# Patient Record
Sex: FEMALE | Race: WHITE | HISPANIC OR LATINO | URBAN - METROPOLITAN AREA
[De-identification: names, ages, dates, MRNs, and addresses within clinical notes are randomized per-mention and may not be internally consistent; named-entity substitution may affect disease eponyms.]

---

## 2017-03-28 PROBLEM — Z00.00 ENCOUNTER FOR PREVENTIVE HEALTH EXAMINATION: Status: ACTIVE | Noted: 2017-03-28

## 2017-03-31 ENCOUNTER — OUTPATIENT (OUTPATIENT)
Dept: OUTPATIENT SERVICES | Facility: HOSPITAL | Age: 62
LOS: 1 days | Discharge: HOME | End: 2017-03-31

## 2017-04-11 ENCOUNTER — APPOINTMENT (OUTPATIENT)
Dept: VASCULAR SURGERY | Facility: CLINIC | Age: 62
End: 2017-04-11

## 2017-04-11 VITALS
HEIGHT: 62 IN | WEIGHT: 134 LBS | BODY MASS INDEX: 24.66 KG/M2 | SYSTOLIC BLOOD PRESSURE: 134 MMHG | DIASTOLIC BLOOD PRESSURE: 80 MMHG

## 2017-04-11 DIAGNOSIS — I65.23 OCCLUSION AND STENOSIS OF BILATERAL CAROTID ARTERIES: ICD-10-CM

## 2017-04-11 DIAGNOSIS — Z86.79 PERSONAL HISTORY OF OTHER DISEASES OF THE CIRCULATORY SYSTEM: ICD-10-CM

## 2017-04-11 DIAGNOSIS — Z86.39 PERSONAL HISTORY OF OTHER ENDOCRINE, NUTRITIONAL AND METABOLIC DISEASE: ICD-10-CM

## 2017-04-11 DIAGNOSIS — Z87.39 PERSONAL HISTORY OF OTHER DISEASES OF THE MUSCULOSKELETAL SYSTEM AND CONNECTIVE TISSUE: ICD-10-CM

## 2017-04-11 RX ORDER — UBIDECARENONE 200 MG
CAPSULE ORAL
Refills: 0 | Status: ACTIVE | COMMUNITY

## 2017-04-11 RX ORDER — LOSARTAN POTASSIUM 100 MG/1
100 TABLET, FILM COATED ORAL
Refills: 0 | Status: ACTIVE | COMMUNITY

## 2017-04-11 RX ORDER — ROSUVASTATIN CALCIUM 10 MG/1
10 TABLET, FILM COATED ORAL
Refills: 0 | Status: ACTIVE | COMMUNITY

## 2017-04-11 RX ORDER — HYDROCHLOROTHIAZIDE 25 MG/1
25 TABLET ORAL
Refills: 0 | Status: ACTIVE | COMMUNITY

## 2017-06-27 DIAGNOSIS — R91.8 OTHER NONSPECIFIC ABNORMAL FINDING OF LUNG FIELD: ICD-10-CM

## 2017-10-18 ENCOUNTER — OUTPATIENT (OUTPATIENT)
Dept: OUTPATIENT SERVICES | Facility: HOSPITAL | Age: 62
LOS: 1 days | Discharge: HOME | End: 2017-10-18

## 2017-10-18 DIAGNOSIS — Z12.31 ENCOUNTER FOR SCREENING MAMMOGRAM FOR MALIGNANT NEOPLASM OF BREAST: ICD-10-CM

## 2017-12-28 ENCOUNTER — TRANSCRIPTION ENCOUNTER (OUTPATIENT)
Age: 62
End: 2017-12-28

## 2018-10-29 ENCOUNTER — EMERGENCY (EMERGENCY)
Facility: HOSPITAL | Age: 63
LOS: 0 days | Discharge: HOME | End: 2018-10-29
Attending: EMERGENCY MEDICINE | Admitting: EMERGENCY MEDICINE

## 2018-10-29 VITALS
SYSTOLIC BLOOD PRESSURE: 215 MMHG | DIASTOLIC BLOOD PRESSURE: 118 MMHG | RESPIRATION RATE: 20 BRPM | OXYGEN SATURATION: 99 % | TEMPERATURE: 97 F | HEART RATE: 93 BPM

## 2018-10-29 VITALS — WEIGHT: 134.92 LBS

## 2018-10-29 DIAGNOSIS — S42.301A UNSPECIFIED FRACTURE OF SHAFT OF HUMERUS, RIGHT ARM, INITIAL ENCOUNTER FOR CLOSED FRACTURE: ICD-10-CM
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
## 2018-10-29 DIAGNOSIS — M25.511 PAIN IN RIGHT SHOULDER: ICD-10-CM
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
## 2018-10-29 DIAGNOSIS — I10 ESSENTIAL (PRIMARY) HYPERTENSION: ICD-10-CM
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
## 2018-10-29 RX ORDER — MORPHINE SULFATE 50 MG/1
4 CAPSULE, EXTENDED RELEASE ORAL ONCE
Qty: 0 | Refills: 0 | Status: DISCONTINUED | OUTPATIENT
Start: 2018-10-29 | End: 2018-10-29

## 2018-10-29 RX ORDER — SODIUM CHLORIDE 9 MG/ML
1000 INJECTION, SOLUTION INTRAVENOUS ONCE
Qty: 0 | Refills: 0 | Status: COMPLETED | OUTPATIENT
Start: 2018-10-29 | End: 2018-10-29

## 2018-10-29 RX ADMIN — SODIUM CHLORIDE 1000 MILLILITER(S): 9 INJECTION, SOLUTION INTRAVENOUS at 13:52

## 2018-10-29 RX ADMIN — MORPHINE SULFATE 4 MILLIGRAM(S): 50 CAPSULE, EXTENDED RELEASE ORAL at 13:10

## 2018-10-29 RX ADMIN — MORPHINE SULFATE 4 MILLIGRAM(S): 50 CAPSULE, EXTENDED RELEASE ORAL at 13:49

## 2018-10-29 NOTE — ED PROVIDER NOTE - PHYSICAL EXAMINATION
CONSTITUTIONAL: well developed, well nourished  HEAD: normocephalic, atraumatic  EYES: PERRL no raccoon eyes  NECK: no midline tenderness, no stepoffs, no deformity  CV: regular rate and rhythm, equal distal pulses  RESP: lungs clear to auscultation bilaterally, normal work of breathing, symmetric rise and fall of chest   CHEST: no chest wall tenderness, no crepitus, no clavicular deformity/tenting  ABD: soft, nondistended, nontender, no rebound, no guarding, no rigidity  BACK: no midline T/L/S tenderness, no stepoffs, no deformity  EXT: swelling and deformity of R shoulder with tenderness along lateral aspect, R shoulder in flexion and internal rotation, 2+ radial and ulnar pulses, sensation intact to touch, normal ROM in elbow and wrist, good  strength, cap refill < 2 seconds  SKIN: no rashes, no lacerations, no abrasions  NEURO: A&Ox3, sensation grossly intact throughout  PSYCH: normal mood, appropriate affect

## 2018-10-29 NOTE — ED PROVIDER NOTE - CARE PROVIDER_API CALL
Kayode Dash (MD), Orthopaedic Surgery  [facility] [place] [address]  Phone: [phone]  Fax: [phone]

## 2018-10-29 NOTE — SBIRT NOTE. - NSSBIRTSERVICES_GEN_A_ED_FT
Provided SBIRT services: Full Screen Negative    Positive reinforcement provided given patient currently within healthy guidelines. Education materials reviewed and given to patient.    AUDIT Score: 5  DAST-10 Score: 0  Duration = 5 Minutes

## 2018-10-29 NOTE — ED PROVIDER NOTE - PROGRESS NOTE DETAILS
62 yo F with isolated R shoulder trauma. Neurovascularly intact. ROM limited secondary to pain. Comminuted fx of humeral neck seen on xray. Given morphine for pain. Placed in shoulder sling and given ortho follow up. ED return precautions given. Family verbalized understanding and was agreeable with plan. 64 Y/O F HTN S/P TRIP AND FALL OVER A BABY GATE. NO HEAD TRAUMA OR LOC. PT C/O R SHOULDER PAIN. PT DENIES ANY OTHER INJURIES. NO RUE PARESTHESIAS OR MOTOR WEAKNESS. PT R HANDED. VITALS NOTED. ALERT OX3 NAD GCS-15 NCAT. NO MIDLINE C SPINE TENDERNESS. R SHOULDER WITH DEFORMITY AND EDEMA AND TENDERNESS. NO AC OR CLAVICLE TENDERNESS. R ELBOW NONTENDER, FROM. 2+ RADIAL PULSES B/L. RUE NVI. NO SENSORY DEFICIT.

## 2018-10-29 NOTE — ED PROVIDER NOTE - NSFOLLOWUPINSTRUCTIONS_ED_ALL_ED_FT
Keep your arm in the sling  Take ibuprofen or tylenol every 6-8 hours as needed  Follow up with your orthopedist within 1 week  Return to the emergency department if you develop numbness, tingling, or weakness

## 2018-10-29 NOTE — ED PROVIDER NOTE - MEDICAL DECISION MAKING DETAILS
64 Y/O F WITH R PROXIMAL HUMERUS FRACTURE S/P MECHANICAL FALL. PT PLACED IN SLING AND GIVEN ORTHOPEDIC FOLLOW UP. PT DISCHARGED TO HOME WITH HER FAMILY AND PT WILL STAY WITH DAUGHTER FOR ASSISTANCE.

## 2018-10-29 NOTE — ED PROVIDER NOTE - OBJECTIVE STATEMENT
64 yo F with hx of HTN and osteopenia who presents with R shoulder pain after mechanical fall that occurred 1 hr prior to arrival. Pt was holding her grandchild in her L arm and tripped over a small gate, hitting her R shoulder against the wall and then against the ground. No head trauma, LOC, or any other trauma. Able to ambulate after but with severe pain in R shoulder, worse with movement. Took ibuprofen 600 mg prior to arrival.

## 2018-10-29 NOTE — ED ADULT NURSE REASSESSMENT NOTE - NS ED NURSE REASSESS COMMENT FT1
Pt A&O X 3, NAD, VSS pain 6/10 right shoulder. Pt with family at bedside, steady gait. Pt awaiting further MD evaluation, will continue to monitor.

## 2018-10-29 NOTE — ED ADULT TRIAGE NOTE - CHIEF COMPLAINT QUOTE
"I was carrying my grandkid on my right arm, I stepped over a doggy fence and I tripped and hit the wall and then fell on the floor." (+) right shoulder pain. Denies LOC. Not taking any anticoagulant.

## 2018-10-29 NOTE — ED PROVIDER NOTE - NS ED ROS FT
GEN:  no fever, no chills  NEURO:  no headache, no LOC  ENT: no sore throat, no runny nose  CV:  no chest pain, no SOB  RESP:  no dyspnea, no cough  GI:  no nausea, no vomiting, no abdominal pain  BACK; no back pain, no neck pain  MSK:  + shoulder pain  SKIN:  no rash, no bruising  HEME: no hematochezia, no melena

## 2018-10-29 NOTE — ED ADULT NURSE NOTE - NSIMPLEMENTINTERV_GEN_ALL_ED
Implemented All Universal Safety Interventions:  [place] to call system. Call bell, personal items and telephone within reach. Instruct patient to call for assistance. Room bathroom lighting operational. Non-slip footwear when patient is off stretcher. Physically safe environment: no spills, clutter or unnecessary equipment. Stretcher in lowest position, wheels locked, appropriate side rails in place.

## 2018-11-08 PROBLEM — Z87.39 PERSONAL HISTORY OF OTHER DISEASES OF THE MUSCULOSKELETAL SYSTEM AND CONNECTIVE TISSUE: Chronic | Status: ACTIVE | Noted: 2018-10-29

## 2018-11-08 PROBLEM — I10 ESSENTIAL (PRIMARY) HYPERTENSION: Chronic | Status: ACTIVE | Noted: 2018-10-29

## 2018-11-08 PROBLEM — E78.00 PURE HYPERCHOLESTEROLEMIA, UNSPECIFIED: Chronic | Status: ACTIVE | Noted: 2018-10-29

## 2018-11-12 ENCOUNTER — OUTPATIENT (OUTPATIENT)
Dept: OUTPATIENT SERVICES | Facility: HOSPITAL | Age: 63
LOS: 1 days | Discharge: HOME | End: 2018-11-12

## 2018-11-12 DIAGNOSIS — M25.511 PAIN IN RIGHT SHOULDER: ICD-10-CM

## 2018-11-12 DIAGNOSIS — M25.521 PAIN IN RIGHT ELBOW: ICD-10-CM

## 2020-01-03 ENCOUNTER — TRANSCRIPTION ENCOUNTER (OUTPATIENT)
Age: 65
End: 2020-01-03

## 2020-02-05 ENCOUNTER — EMERGENCY (EMERGENCY)
Facility: HOSPITAL | Age: 65
LOS: 0 days | Discharge: HOME | End: 2020-02-05
Attending: EMERGENCY MEDICINE | Admitting: EMERGENCY MEDICINE
Payer: MEDICARE

## 2020-02-05 VITALS — DIASTOLIC BLOOD PRESSURE: 67 MMHG | HEART RATE: 60 BPM | SYSTOLIC BLOOD PRESSURE: 141 MMHG

## 2020-02-05 VITALS
RESPIRATION RATE: 18 BRPM | SYSTOLIC BLOOD PRESSURE: 226 MMHG | HEIGHT: 62 IN | HEART RATE: 73 BPM | OXYGEN SATURATION: 98 % | WEIGHT: 132.06 LBS | DIASTOLIC BLOOD PRESSURE: 102 MMHG | TEMPERATURE: 91 F

## 2020-02-05 DIAGNOSIS — I10 ESSENTIAL (PRIMARY) HYPERTENSION: ICD-10-CM

## 2020-02-05 DIAGNOSIS — R51 HEADACHE: ICD-10-CM

## 2020-02-05 DIAGNOSIS — Z79.899 OTHER LONG TERM (CURRENT) DRUG THERAPY: ICD-10-CM

## 2020-02-05 LAB
ANION GAP SERPL CALC-SCNC: 13 MMOL/L — SIGNIFICANT CHANGE UP (ref 7–14)
BASOPHILS # BLD AUTO: 0.02 K/UL — SIGNIFICANT CHANGE UP (ref 0–0.2)
BASOPHILS NFR BLD AUTO: 0.4 % — SIGNIFICANT CHANGE UP (ref 0–1)
BUN SERPL-MCNC: 16 MG/DL — SIGNIFICANT CHANGE UP (ref 10–20)
CALCIUM SERPL-MCNC: 9.5 MG/DL — SIGNIFICANT CHANGE UP (ref 8.5–10.1)
CHLORIDE SERPL-SCNC: 98 MMOL/L — SIGNIFICANT CHANGE UP (ref 98–110)
CO2 SERPL-SCNC: 27 MMOL/L — SIGNIFICANT CHANGE UP (ref 17–32)
CREAT SERPL-MCNC: 0.7 MG/DL — SIGNIFICANT CHANGE UP (ref 0.7–1.5)
EOSINOPHIL # BLD AUTO: 0.15 K/UL — SIGNIFICANT CHANGE UP (ref 0–0.7)
EOSINOPHIL NFR BLD AUTO: 3.1 % — SIGNIFICANT CHANGE UP (ref 0–8)
GLUCOSE SERPL-MCNC: 97 MG/DL — SIGNIFICANT CHANGE UP (ref 70–99)
HCT VFR BLD CALC: 39.1 % — SIGNIFICANT CHANGE UP (ref 37–47)
HGB BLD-MCNC: 13.5 G/DL — SIGNIFICANT CHANGE UP (ref 12–16)
IMM GRANULOCYTES NFR BLD AUTO: 0.4 % — HIGH (ref 0.1–0.3)
LYMPHOCYTES # BLD AUTO: 1.44 K/UL — SIGNIFICANT CHANGE UP (ref 1.2–3.4)
LYMPHOCYTES # BLD AUTO: 29.7 % — SIGNIFICANT CHANGE UP (ref 20.5–51.1)
MCHC RBC-ENTMCNC: 29.7 PG — SIGNIFICANT CHANGE UP (ref 27–31)
MCHC RBC-ENTMCNC: 34.5 G/DL — SIGNIFICANT CHANGE UP (ref 32–37)
MCV RBC AUTO: 86.1 FL — SIGNIFICANT CHANGE UP (ref 81–99)
MONOCYTES # BLD AUTO: 0.45 K/UL — SIGNIFICANT CHANGE UP (ref 0.1–0.6)
MONOCYTES NFR BLD AUTO: 9.3 % — SIGNIFICANT CHANGE UP (ref 1.7–9.3)
NEUTROPHILS # BLD AUTO: 2.77 K/UL — SIGNIFICANT CHANGE UP (ref 1.4–6.5)
NEUTROPHILS NFR BLD AUTO: 57.1 % — SIGNIFICANT CHANGE UP (ref 42.2–75.2)
NRBC # BLD: 0 /100 WBCS — SIGNIFICANT CHANGE UP (ref 0–0)
PLATELET # BLD AUTO: 184 K/UL — SIGNIFICANT CHANGE UP (ref 130–400)
POTASSIUM SERPL-MCNC: 3.8 MMOL/L — SIGNIFICANT CHANGE UP (ref 3.5–5)
POTASSIUM SERPL-SCNC: 3.8 MMOL/L — SIGNIFICANT CHANGE UP (ref 3.5–5)
RBC # BLD: 4.54 M/UL — SIGNIFICANT CHANGE UP (ref 4.2–5.4)
RBC # FLD: 13.7 % — SIGNIFICANT CHANGE UP (ref 11.5–14.5)
SODIUM SERPL-SCNC: 138 MMOL/L — SIGNIFICANT CHANGE UP (ref 135–146)
WBC # BLD: 4.85 K/UL — SIGNIFICANT CHANGE UP (ref 4.8–10.8)
WBC # FLD AUTO: 4.85 K/UL — SIGNIFICANT CHANGE UP (ref 4.8–10.8)

## 2020-02-05 PROCEDURE — 99284 EMERGENCY DEPT VISIT MOD MDM: CPT

## 2020-02-05 RX ORDER — IBUPROFEN 200 MG
600 TABLET ORAL ONCE
Refills: 0 | Status: COMPLETED | OUTPATIENT
Start: 2020-02-05 | End: 2020-02-05

## 2020-02-05 RX ORDER — METOPROLOL TARTRATE 50 MG
50 TABLET ORAL ONCE
Refills: 0 | Status: COMPLETED | OUTPATIENT
Start: 2020-02-05 | End: 2020-02-05

## 2020-02-05 RX ORDER — ACETAMINOPHEN 500 MG
975 TABLET ORAL ONCE
Refills: 0 | Status: COMPLETED | OUTPATIENT
Start: 2020-02-05 | End: 2020-02-05

## 2020-02-05 RX ORDER — METOPROLOL TARTRATE 50 MG
1 TABLET ORAL
Qty: 60 | Refills: 0
Start: 2020-02-05 | End: 2020-03-05

## 2020-02-05 RX ADMIN — Medication 975 MILLIGRAM(S): at 14:08

## 2020-02-05 RX ADMIN — Medication 600 MILLIGRAM(S): at 15:25

## 2020-02-05 RX ADMIN — Medication 50 MILLIGRAM(S): at 14:09

## 2020-02-05 NOTE — ED PROVIDER NOTE - PHYSICAL EXAMINATION
VITAL SIGNS: I have reviewed nursing notes and confirm.  CONSTITUTIONAL: Well-developed; well-nourished; in no acute distress.   SKIN:  skin exam is warm and dry, no acute rash.    HEAD: Normocephalic; atraumatic.  EYES:  conjunctiva and sclera clear.  ENT: No nasal discharge; airway clear.  CARD: S1, S2 normal; no murmurs, gallops, or rubs. Regular rate and rhythm.   RESP: No wheezes, rales or rhonchi.  ABD: Normal bowel sounds; soft; non-distended; non-tender  EXT: Normal ROM.  No clubbing, cyanosis or edema.   LYMPH: No acute cervical adenopathy.  NEURO: ambulating well, steady gait, sensation intact, muscle strength 5/5 throughout Alert, oriented, grossly unremarkable

## 2020-02-05 NOTE — ED PROVIDER NOTE - PROGRESS NOTE DETAILS
pt headache improved, still present, given scrot for Meotprolol 50 BID as per rec from Dr. Monreal, will f/u this week, pt agreeable to plan

## 2020-02-05 NOTE — ED ADULT NURSE NOTE - NSIMPLEMENTINTERV_GEN_ALL_ED
Implemented All Universal Safety Interventions:  [place] to call system. Call bell, personal items and telephone within reach. Instruct patient to call for assistance. Room bathroom lighting operational. Non-slip footwear when patient is off stretcher. Physically safe environment: no spills, clutter or unnecessary equipment. Stretcher in lowest position, wheels locked, appropriate side rails in place.

## 2020-02-05 NOTE — ED PROVIDER NOTE - PATIENT PORTAL LINK FT
You can access the FollowMyHealth Patient Portal offered by [facility] by registering at the following website: [email or web address]. By joining [employer]’s FollowMyHealth portal, you will also be able to view your health information using other applications (apps) compatible with our system.

## 2020-02-05 NOTE — ED PROVIDER NOTE - OBJECTIVE STATEMENT
Pt is a 63y/o female with a pmhx of HTN, hld presents today for eval of 1 week of tension like headache, that has been constant, gradual onset, no aggravating/alleviating factors. Pt denies fever, chills, weakness, numbness, CP, SOB, Abd pain.

## 2020-02-05 NOTE — ED PROVIDER NOTE - NSFOLLOWUPINSTRUCTIONS_ED_ALL_ED_FT
General Headache Without Cause  A headache is pain or discomfort felt around the head or neck area. The specific cause of a headache may not be found. There are many causes and types of headaches. A few common ones are:    Tension headaches.  Migraine headaches.  Cluster headaches.  Chronic daily headaches.    Follow these instructions at home:  Watch your condition for any changes. Take these steps to help with your condition:    Managing pain     Take over-the-counter and prescription medicines only as told by your health care provider.  Lie down in a dark, quiet room when you have a headache.  If directed, apply ice to the head and neck area:    Put ice in a plastic bag.  Place a towel between your skin and the bag.  Leave the ice on for 20 minutes, 2–3 times per day.    Use a heating pad or hot shower to apply heat to the head and neck area as told by your health care provider.  ImageKeep lights dim if bright lights bother you or make your headaches worse.  Eating and drinking     Eat meals on a regular schedule.  Limit alcohol use.  Decrease the amount of caffeine you drink, or stop drinking caffeine.  General instructions     Keep all follow-up visits as told by your health care provider. This is important.  Keep a headache journal to help find out what may trigger your headaches. For example, write down:    What you eat and drink.  How much sleep you get.  Any change to your diet or medicines.    Try massage or other relaxation techniques.  Limit stress.  Sit up straight, and do not tense your muscles.  Do not use tobacco products, including cigarettes, chewing tobacco, or e-cigarettes. If you need help quitting, ask your health care provider.  Exercise regularly as told by your health care provider.  ImageSleep on a regular schedule. Get 7–9 hours of sleep, or the amount recommended by your health care provider.  Contact a health care provider if:  Your symptoms are not helped by medicine.  You have a headache that is different from the usual headache.  You have nausea or you vomit.  You have a fever.  Get help right away if:  Your headache becomes severe.  You have repeated vomiting.  You have a stiff neck.  You have a loss of vision.  You have problems with speech.  You have pain in the eye or ear.  You have muscular weakness or loss of muscle control.  You lose your balance or have trouble walking.  You feel faint or pass out.  You have confusion.  This information is not intended to replace advice given to you by your health care provider. Make sure you discuss any questions you have with your health care provider.    Hypertension  Hypertension, commonly called high blood pressure, is when the force of blood pumping through the arteries is too strong. The arteries are the blood vessels that carry blood from the heart throughout the body. Hypertension forces the heart to work harder to pump blood and may cause arteries to become narrow or stiff. Having untreated or uncontrolled hypertension can cause heart attacks, strokes, kidney disease, and other problems.    A blood pressure reading consists of a higher number over a lower number. Ideally, your blood pressure should be below 120/80. The first ("top") number is called the systolic pressure. It is a measure of the pressure in your arteries as your heart beats. The second ("bottom") number is called the diastolic pressure. It is a measure of the pressure in your arteries as the heart relaxes.    What are the causes?  The cause of this condition is not known.    What increases the risk?  Some risk factors for high blood pressure are under your control. Others are not.    Factors you can change     Smoking.  Having type 2 diabetes mellitus, high cholesterol, or both.  Not getting enough exercise or physical activity.  Being overweight.  Having too much fat, sugar, calories, or salt (sodium) in your diet.  Drinking too much alcohol.  Factors that are difficult or impossible to change     Having chronic kidney disease.  Having a family history of high blood pressure.  Age. Risk increases with age.  Race. You may be at higher risk if you are -American.  Gender. Men are at higher risk than women before age 45. After age 65, women are at higher risk than men.  Having obstructive sleep apnea.  Stress.  What are the signs or symptoms?  Extremely high blood pressure (hypertensive crisis) may cause:    Headache.  Anxiety.  Shortness of breath.  Nosebleed.  Nausea and vomiting.  Severe chest pain.  Jerky movements you cannot control (seizures).    How is this diagnosed?  This condition is diagnosed by measuring your blood pressure while you are seated, with your arm resting on a surface. The cuff of the blood pressure monitor will be placed directly against the skin of your upper arm at the level of your heart. It should be measured at least twice using the same arm. Certain conditions can cause a difference in blood pressure between your right and left arms.    Certain factors can cause blood pressure readings to be lower or higher than normal (elevated) for a short period of time:    When your blood pressure is higher when you are in a health care provider's office than when you are at home, this is called white coat hypertension. Most people with this condition do not need medicines.  When your blood pressure is higher at home than when you are in a health care provider's office, this is called masked hypertension. Most people with this condition may need medicines to control blood pressure.    If you have a high blood pressure reading during one visit or you have normal blood pressure with other risk factors:    You may be asked to return on a different day to have your blood pressure checked again.  You may be asked to monitor your blood pressure at home for 1 week or longer.    If you are diagnosed with hypertension, you may have other blood or imaging tests to help your health care provider understand your overall risk for other conditions.    How is this treated?  This condition is treated by making healthy lifestyle changes, such as eating healthy foods, exercising more, and reducing your alcohol intake. Your health care provider may prescribe medicine if lifestyle changes are not enough to get your blood pressure under control, and if:    Your systolic blood pressure is above 130.  Your diastolic blood pressure is above 80.    Your personal target blood pressure may vary depending on your medical conditions, your age, and other factors.    Follow these instructions at home:  Eating and drinking     Eat a diet that is high in fiber and potassium, and low in sodium, added sugar, and fat. An example eating plan is called the DASH (Dietary Approaches to Stop Hypertension) diet. To eat this way:    Eat plenty of fresh fruits and vegetables. Try to fill half of your plate at each meal with fruits and vegetables.  Eat whole grains, such as whole wheat pasta, brown rice, or whole grain bread. Fill about one quarter of your plate with whole grains.  Eat or drink low-fat dairy products, such as skim milk or low-fat yogurt.  Avoid fatty cuts of meat, processed or cured meats, and poultry with skin. Fill about one quarter of your plate with lean proteins, such as fish, chicken without skin, beans, eggs, and tofu.  Avoid premade and processed foods. These tend to be higher in sodium, added sugar, and fat.    Reduce your daily sodium intake. Most people with hypertension should eat less than 1,500 mg of sodium a day.  ImageLimit alcohol intake to no more than 1 drink a day for nonpregnant women and 2 drinks a day for men. One drink equals 12 oz of beer, 5 oz of wine, or 1½ oz of hard liquor.  Lifestyle     Work with your health care provider to maintain a healthy body weight or to lose weight. Ask what an ideal weight is for you.  Get at least 30 minutes of exercise that causes your heart to beat faster (aerobic exercise) most days of the week. Activities may include walking, swimming, or biking.  Include exercise to strengthen your muscles (resistance exercise), such as pilates or lifting weights, as part of your weekly exercise routine. Try to do these types of exercises for 30 minutes at least 3 days a week.  Do not use any products that contain nicotine or tobacco, such as cigarettes and e-cigarettes. If you need help quitting, ask your health care provider.  Monitor your blood pressure at home as told by your health care provider.  Keep all follow-up visits as told by your health care provider. This is important.  Medicines     Take over-the-counter and prescription medicines only as told by your health care provider. Follow directions carefully. Blood pressure medicines must be taken as prescribed.  Do not skip doses of blood pressure medicine. Doing this puts you at risk for problems and can make the medicine less effective.  Ask your health care provider about side effects or reactions to medicines that you should watch for.  Contact a health care provider if:  You think you are having a reaction to a medicine you are taking.  You have headaches that keep coming back (recurring).  You feel dizzy.  You have swelling in your ankles.  You have trouble with your vision.  Get help right away if:  You develop a severe headache or confusion.  You have unusual weakness or numbness.  You feel faint.  You have severe pain in your chest or abdomen.  You vomit repeatedly.  You have trouble breathing.  Summary  Hypertension is when the force of blood pumping through your arteries is too strong. If this condition is not controlled, it may put you at risk for serious complications.  Your personal target blood pressure may vary depending on your medical conditions, your age, and other factors. For most people, a normal blood pressure is less than 120/80.  Hypertension is treated with lifestyle changes, medicines, or a combination of both. Lifestyle changes include weight loss, eating a healthy, low-sodium diet, exercising more, and limiting alcohol.  This information is not intended to replace advice given to you by your health care provider. Make sure you discuss any questions you have with your health care provider.

## 2020-02-05 NOTE — ED PROVIDER NOTE - ATTENDING CONTRIBUTION TO CARE
65 yo F PMHx noted including HTN, high cholesterol presents with c/o high BP.  Pt states that for the last 3 days her BP has been high.  Pt was seen by her PMD yesterday and started on HCTZ 25 mg.  Pt so far has taken 2 doses.  Pt with tension like headache, took her BP at pharmacy and found it high so came to ED, no CP, no change in vision.  On exam pt in NAD AAo x 3, seen eating a sandwich, face symmetric, speevh is clear and fluent, PERRL, EOMI, Lungs cta b/l, seen ambulate with steady gait, good tone, equal strength

## 2020-02-05 NOTE — ED PROVIDER NOTE - CLINICAL SUMMARY MEDICAL DECISION MAKING FREE TEXT BOX
Labs reviewed and results d/w patient.  BP improved with medication.  SETH Collazo spoke with PMD, Dr. Monreal.  She Rec to add Metoprolol 50 mg BID.  Pt instructed to keep BP diary,  and to follow up with PMD within the next 1-2 days for BP check. Pt verbalizes understanding,

## 2020-02-05 NOTE — ED PROVIDER NOTE - CARE PROVIDER_API CALL
Mallory Kimball (DO)  Internal Medicine  [address] [place] [address]  Phone: [phone]  Fax: [phone]  Follow Up Time:

## 2020-02-05 NOTE — ED PROVIDER NOTE - NS ED ROS FT
Eyes:  No visual changes, eye pain or discharge.  ENMT:  No hearing changes, pain, discharge or infections. No neck pain or stiffness.  Cardiac:  No chest pain, SOB or edema. No chest pain with exertion.  Respiratory:  No cough or respiratory distress. No hemoptysis. No history of asthma or RAD.  GI:  No nausea, vomiting, diarrhea or abdominal pain.  :  No dysuria, frequency or burning.  MS:  No myalgia, muscle weakness, joint pain or back pain.  Neuro:  + HA No  weakness.  No LOC.  Skin:  No skin rash.   Endocrine: No history of thyroid disease or diabetes.  Except as documented in the HPI,  all other systems are negative.

## 2020-04-28 PROBLEM — J18.9 PNEUMONIA, UNSPECIFIED ORGANISM: Chronic | Status: ACTIVE | Noted: 2020-02-05

## 2020-04-30 ENCOUNTER — OUTPATIENT (OUTPATIENT)
Dept: OUTPATIENT SERVICES | Facility: HOSPITAL | Age: 65
LOS: 1 days | Discharge: HOME | End: 2020-04-30
Payer: MEDICARE

## 2020-04-30 DIAGNOSIS — I25.10 ATHEROSCLEROTIC HEART DISEASE OF NATIVE CORONARY ARTERY WITHOUT ANGINA PECTORIS: ICD-10-CM

## 2020-04-30 PROCEDURE — 75574 CT ANGIO HRT W/3D IMAGE: CPT | Mod: 26

## 2020-08-06 ENCOUNTER — OUTPATIENT (OUTPATIENT)
Dept: OUTPATIENT SERVICES | Facility: HOSPITAL | Age: 65
LOS: 1 days | Discharge: HOME | End: 2020-08-06
Payer: MEDICARE

## 2020-08-06 DIAGNOSIS — I65.29 OCCLUSION AND STENOSIS OF UNSPECIFIED CAROTID ARTERY: ICD-10-CM

## 2020-08-06 PROCEDURE — 70547 MR ANGIOGRAPHY NECK W/O DYE: CPT | Mod: 26

## 2020-08-13 ENCOUNTER — OUTPATIENT (OUTPATIENT)
Dept: OUTPATIENT SERVICES | Facility: HOSPITAL | Age: 65
LOS: 1 days | Discharge: HOME | End: 2020-08-13
Payer: MEDICARE

## 2020-08-13 VITALS
SYSTOLIC BLOOD PRESSURE: 168 MMHG | RESPIRATION RATE: 16 BRPM | OXYGEN SATURATION: 99 % | HEART RATE: 76 BPM | HEIGHT: 62 IN | TEMPERATURE: 98 F | DIASTOLIC BLOOD PRESSURE: 80 MMHG | WEIGHT: 154.98 LBS

## 2020-08-13 DIAGNOSIS — R42 DIZZINESS AND GIDDINESS: ICD-10-CM

## 2020-08-13 DIAGNOSIS — Z01.818 ENCOUNTER FOR OTHER PREPROCEDURAL EXAMINATION: ICD-10-CM

## 2020-08-13 LAB
ALBUMIN SERPL ELPH-MCNC: 5.2 G/DL — SIGNIFICANT CHANGE UP (ref 3.5–5.2)
ALP SERPL-CCNC: 78 U/L — SIGNIFICANT CHANGE UP (ref 30–115)
ALT FLD-CCNC: 58 U/L — HIGH (ref 0–41)
ANION GAP SERPL CALC-SCNC: 15 MMOL/L — HIGH (ref 7–14)
APPEARANCE UR: CLEAR — SIGNIFICANT CHANGE UP
APTT BLD: 30.2 SEC — SIGNIFICANT CHANGE UP (ref 27–39.2)
AST SERPL-CCNC: 35 U/L — SIGNIFICANT CHANGE UP (ref 0–41)
BASOPHILS # BLD AUTO: 0.01 K/UL — SIGNIFICANT CHANGE UP (ref 0–0.2)
BASOPHILS NFR BLD AUTO: 0.2 % — SIGNIFICANT CHANGE UP (ref 0–1)
BILIRUB SERPL-MCNC: 0.4 MG/DL — SIGNIFICANT CHANGE UP (ref 0.2–1.2)
BILIRUB UR-MCNC: NEGATIVE — SIGNIFICANT CHANGE UP
BLD GP AB SCN SERPL QL: SIGNIFICANT CHANGE UP
BUN SERPL-MCNC: 18 MG/DL — SIGNIFICANT CHANGE UP (ref 10–20)
CALCIUM SERPL-MCNC: 10.3 MG/DL — HIGH (ref 8.5–10.1)
CHLORIDE SERPL-SCNC: 98 MMOL/L — SIGNIFICANT CHANGE UP (ref 98–110)
CO2 SERPL-SCNC: 28 MMOL/L — SIGNIFICANT CHANGE UP (ref 17–32)
COLOR SPEC: SIGNIFICANT CHANGE UP
CREAT SERPL-MCNC: 0.8 MG/DL — SIGNIFICANT CHANGE UP (ref 0.7–1.5)
DIFF PNL FLD: NEGATIVE — SIGNIFICANT CHANGE UP
EOSINOPHIL # BLD AUTO: 0.16 K/UL — SIGNIFICANT CHANGE UP (ref 0–0.7)
EOSINOPHIL NFR BLD AUTO: 2.8 % — SIGNIFICANT CHANGE UP (ref 0–8)
GLUCOSE SERPL-MCNC: 77 MG/DL — SIGNIFICANT CHANGE UP (ref 70–99)
GLUCOSE UR QL: NEGATIVE — SIGNIFICANT CHANGE UP
HCT VFR BLD CALC: 41.2 % — SIGNIFICANT CHANGE UP (ref 37–47)
HGB BLD-MCNC: 13.8 G/DL — SIGNIFICANT CHANGE UP (ref 12–16)
IMM GRANULOCYTES NFR BLD AUTO: 0.5 % — HIGH (ref 0.1–0.3)
INR BLD: 0.88 RATIO — SIGNIFICANT CHANGE UP (ref 0.65–1.3)
KETONES UR-MCNC: NEGATIVE — SIGNIFICANT CHANGE UP
LEUKOCYTE ESTERASE UR-ACNC: NEGATIVE — SIGNIFICANT CHANGE UP
LYMPHOCYTES # BLD AUTO: 1.77 K/UL — SIGNIFICANT CHANGE UP (ref 1.2–3.4)
LYMPHOCYTES # BLD AUTO: 31.4 % — SIGNIFICANT CHANGE UP (ref 20.5–51.1)
MCHC RBC-ENTMCNC: 30.6 PG — SIGNIFICANT CHANGE UP (ref 27–31)
MCHC RBC-ENTMCNC: 33.5 G/DL — SIGNIFICANT CHANGE UP (ref 32–37)
MCV RBC AUTO: 91.4 FL — SIGNIFICANT CHANGE UP (ref 81–99)
MONOCYTES # BLD AUTO: 0.57 K/UL — SIGNIFICANT CHANGE UP (ref 0.1–0.6)
MONOCYTES NFR BLD AUTO: 10.1 % — HIGH (ref 1.7–9.3)
NEUTROPHILS # BLD AUTO: 3.1 K/UL — SIGNIFICANT CHANGE UP (ref 1.4–6.5)
NEUTROPHILS NFR BLD AUTO: 55 % — SIGNIFICANT CHANGE UP (ref 42.2–75.2)
NITRITE UR-MCNC: NEGATIVE — SIGNIFICANT CHANGE UP
NRBC # BLD: 0 /100 WBCS — SIGNIFICANT CHANGE UP (ref 0–0)
PH UR: 7 — SIGNIFICANT CHANGE UP (ref 5–8)
PLATELET # BLD AUTO: 176 K/UL — SIGNIFICANT CHANGE UP (ref 130–400)
POTASSIUM SERPL-MCNC: 3.6 MMOL/L — SIGNIFICANT CHANGE UP (ref 3.5–5)
POTASSIUM SERPL-SCNC: 3.6 MMOL/L — SIGNIFICANT CHANGE UP (ref 3.5–5)
PROT SERPL-MCNC: 7.6 G/DL — SIGNIFICANT CHANGE UP (ref 6–8)
PROT UR-MCNC: NEGATIVE — SIGNIFICANT CHANGE UP
PROTHROM AB SERPL-ACNC: 10.1 SEC — SIGNIFICANT CHANGE UP (ref 9.95–12.87)
RBC # BLD: 4.51 M/UL — SIGNIFICANT CHANGE UP (ref 4.2–5.4)
RBC # FLD: 13 % — SIGNIFICANT CHANGE UP (ref 11.5–14.5)
SODIUM SERPL-SCNC: 141 MMOL/L — SIGNIFICANT CHANGE UP (ref 135–146)
SP GR SPEC: 1.01 — LOW (ref 1.01–1.02)
UROBILINOGEN FLD QL: SIGNIFICANT CHANGE UP
WBC # BLD: 5.64 K/UL — SIGNIFICANT CHANGE UP (ref 4.8–10.8)
WBC # FLD AUTO: 5.64 K/UL — SIGNIFICANT CHANGE UP (ref 4.8–10.8)

## 2020-08-13 PROCEDURE — 71046 X-RAY EXAM CHEST 2 VIEWS: CPT | Mod: 26

## 2020-08-13 PROCEDURE — 93010 ELECTROCARDIOGRAM REPORT: CPT

## 2020-08-13 RX ORDER — AMLODIPINE BESYLATE 2.5 MG/1
1 TABLET ORAL
Qty: 0 | Refills: 0 | DISCHARGE

## 2020-08-13 NOTE — H&P PST ADULT - NSICDXPASTMEDICALHX_GEN_ALL_CORE_FT
PAST MEDICAL HISTORY:  Carotid stenosis     H/O osteopenia     High cholesterol     HTN (hypertension)     Pneumonia

## 2020-08-13 NOTE — H&P PST ADULT - NSANTHOSAYNRD_GEN_A_CORE
No. DAVE screening performed.  STOP BANG Legend: 0-2 = LOW Risk; 3-4 = INTERMEDIATE Risk; 5-8 = HIGH Risk

## 2020-08-13 NOTE — H&P PST ADULT - I HAVE PERSONALLY SEEN AND EXAMINED THE PATIENT. THERE HAVE NOT BEEN ANY CHANGES IN THE PATIENT'S HISTORY OR EXAM UNLESS COMMENTED BELOW
Date of service 1/11/2020  Chief complaint follow-up  Generalized weakness    History of present illness  No major overnight events reported  Right shoulder pain better than previous day  No chest pain or shortness of breath  Fair appetite  Patient had 1 loose stool this morning       Medication reviewed    Physical examination     Not in pain  Not in distress  Chest, clear to auscultation, no use of accessory muscles  Abdomen soft, bowel sounds are present, nontender, no mass, no hepatosplenomegaly  Musculoskeletal system, no significant joint deformity swelling in all 4 extremities    Visit Vitals  Blood Pressure (Abnormal) 138/107 (BP Location: LUE - Left upper extremity, Patient Position: Semi-Granados's)   Pulse 62   Temperature 98.1 °F (36.7 °C) (Oral)   Respiration 18   Height 5' 3\" (1.6 m)   Weight 70.9 kg   Oxygen Saturation 96%   Body Mass Index 27.69 kg/m²           XR ABDOMEN AP KUB   Final Result   IMPRESSION: No focal bowel distention or excess stool.         XR CHEST AP OR PA - PORTABLE   Final Result   IMPRESSION: As above.           Recent Labs   Lab 01/09/20  0725 01/07/20  0631   WBC  --  10.2   HGB  --  10.7*   HCT  --  33.1*   PLT  --  231   RBC  --  3.25*   MCV  --  101.8*   MCH  --  32.9   MCHC  --  32.3   SODIUM 135 136   CHLORIDE 98 100   BUN 59* 59*   CREATININE 3.50* 3.73*   CO2 27 24   POTASSIUM 3.9 4.5       CMP  Recent Labs   Lab 01/09/20  0725 01/07/20  0631   SODIUM 135 136   CHLORIDE 98 100   BUN 59* 59*   GLUCOSE 135* 143*   POTASSIUM 3.9 4.5   CO2 27 24     No results found    BMP  Recent Labs   Lab 01/09/20  0725 01/07/20  0631   SODIUM 135 136   CHLORIDE 98 100   BUN 59* 59*   GLUCOSE 135* 143*   POTASSIUM 3.9 4.5   CO2 27 24   CREATININE 3.50* 3.73*   CALCIUM 8.5 8.6     Assessment and plan  1. Generalized weakness  2. End-stage renal disease  Receiving hemodialysis  3. Right Shoulder pain, consultation from Orthopedics has been obtained  Patient underwent steroid  injection  Better controlled  Atrial fibrillation, chronic, heart rate control  Continue Cardizem  Continue Coumadin  Recent Labs     01/09/20  0725 01/10/20  1034   INR 1.9 1.7         Diabetes mellitus type 2 blood sugar controlled continue current regimen    Major depressive disorder with anxiety  Continue medications    Patient awaiting placement    Susy Hoang  Hospitalist, [address] [address]   TEL. [phone]   FAX. [phone]       Statement Selected

## 2020-08-13 NOTE — H&P PST ADULT - HISTORY OF PRESENT ILLNESS
65 yo female presents s/p smoking cessation, in 2/2020 my bp went [place] high& i came to the  hospital&  i saw vascular& cardiologist, the ultrasound showed that the right side of my neck is blocked" pt is scheduled for rcea  denies chest pain, palpitations, shortness of breath, dyspnea, or dysuria. exercise tolerance: 2 blocks/ flights of stairs w/o sob  pt denies any known exposure to COVID-19, denies any S&S  pt instructed re: self quarantine guidelines    Anesthesia Alert  NO--Difficult Airway  NO--History of neck surgery or radiation  NO--Limited ROM of neck  NO--History of Malignant hyperthermia  NO--No personal or family history of Pseudocholinesterase deficiency.  NO--Prior Anesthesia Complication  NO--Latex Allergy  NO--Loose teeth  NO--History of Rheumatoid Arthritis  NO--DAVE  NO--Other_____ 65 yo female presents s/p smoking cessation, "in 2/2020 my bp went [place] high& i came to the  hospital&  i saw vascular& cardiologist, the ultrasound showed that the right side of my neck is blocked" pt is scheduled for rcea  denies chest pain, palpitations, shortness of breath, dyspnea, or dysuria. exercise tolerance: 2 blocks/ flights of stairs w/o sob  pt denies any known exposure to COVID-19, denies any S&S  pt instructed re: self quarantine guidelines    Anesthesia Alert  NO--Difficult Airway  NO--History of neck surgery or radiation  NO--Limited ROM of neck  NO--History of Malignant hyperthermia  NO--No personal or family history of Pseudocholinesterase deficiency.  NO--Prior Anesthesia Complication  NO--Latex Allergy  NO--Loose teeth  NO--History of Rheumatoid Arthritis  NO--DAVE  NO--Other_____

## 2020-08-21 ENCOUNTER — OUTPATIENT (OUTPATIENT)
Dept: OUTPATIENT SERVICES | Facility: HOSPITAL | Age: 65
LOS: 1 days | Discharge: HOME | End: 2020-08-21

## 2020-08-21 DIAGNOSIS — Z11.59 ENCOUNTER FOR SCREENING FOR OTHER VIRAL DISEASES: ICD-10-CM

## 2020-08-22 PROBLEM — I65.29 OCCLUSION AND STENOSIS OF UNSPECIFIED CAROTID ARTERY: Chronic | Status: ACTIVE | Noted: 2020-08-13

## 2020-08-24 ENCOUNTER — RESULT REVIEW (OUTPATIENT)
Age: 65
End: 2020-08-24

## 2020-08-24 ENCOUNTER — INPATIENT (INPATIENT)
Facility: HOSPITAL | Age: 65
LOS: 1 days | Discharge: HOME | End: 2020-08-26
Attending: SURGERY | Admitting: SURGERY
Payer: MEDICARE

## 2020-08-24 VITALS
WEIGHT: 151.9 LBS | HEART RATE: 73 BPM | SYSTOLIC BLOOD PRESSURE: 157 MMHG | DIASTOLIC BLOOD PRESSURE: 68 MMHG | HEIGHT: 62 IN | TEMPERATURE: 98 F | RESPIRATION RATE: 18 BRPM

## 2020-08-24 DIAGNOSIS — R42 DIZZINESS AND GIDDINESS: ICD-10-CM

## 2020-08-24 LAB
ANION GAP SERPL CALC-SCNC: 13 MMOL/L — SIGNIFICANT CHANGE UP (ref 7–14)
BLD GP AB SCN SERPL QL: SIGNIFICANT CHANGE UP
BUN SERPL-MCNC: 19 MG/DL — SIGNIFICANT CHANGE UP (ref 10–20)
CALCIUM SERPL-MCNC: 7.9 MG/DL — LOW (ref 8.5–10.1)
CHLORIDE SERPL-SCNC: 103 MMOL/L — SIGNIFICANT CHANGE UP (ref 98–110)
CK MB CFR SERPL CALC: 2 NG/ML — SIGNIFICANT CHANGE UP (ref 0.6–6.3)
CK SERPL-CCNC: 81 U/L — SIGNIFICANT CHANGE UP (ref 0–225)
CO2 SERPL-SCNC: 22 MMOL/L — SIGNIFICANT CHANGE UP (ref 17–32)
CREAT SERPL-MCNC: 0.8 MG/DL — SIGNIFICANT CHANGE UP (ref 0.7–1.5)
GLUCOSE SERPL-MCNC: 147 MG/DL — HIGH (ref 70–99)
HCT VFR BLD CALC: 30.3 % — LOW (ref 37–47)
HGB BLD-MCNC: 10.3 G/DL — LOW (ref 12–16)
MAGNESIUM SERPL-MCNC: 1.6 MG/DL — LOW (ref 1.8–2.4)
MCHC RBC-ENTMCNC: 30.7 PG — SIGNIFICANT CHANGE UP (ref 27–31)
MCHC RBC-ENTMCNC: 34 G/DL — SIGNIFICANT CHANGE UP (ref 32–37)
MCV RBC AUTO: 90.4 FL — SIGNIFICANT CHANGE UP (ref 81–99)
NRBC # BLD: 0 /100 WBCS — SIGNIFICANT CHANGE UP (ref 0–0)
PHOSPHATE SERPL-MCNC: 3.5 MG/DL — SIGNIFICANT CHANGE UP (ref 2.1–4.9)
PLATELET # BLD AUTO: 155 K/UL — SIGNIFICANT CHANGE UP (ref 130–400)
POTASSIUM SERPL-MCNC: 3.2 MMOL/L — LOW (ref 3.5–5)
POTASSIUM SERPL-SCNC: 3.2 MMOL/L — LOW (ref 3.5–5)
RBC # BLD: 3.35 M/UL — LOW (ref 4.2–5.4)
RBC # FLD: 13.1 % — SIGNIFICANT CHANGE UP (ref 11.5–14.5)
SODIUM SERPL-SCNC: 138 MMOL/L — SIGNIFICANT CHANGE UP (ref 135–146)
TROPONIN T SERPL-MCNC: <0.01 NG/ML — SIGNIFICANT CHANGE UP
WBC # BLD: 8.22 K/UL — SIGNIFICANT CHANGE UP (ref 4.8–10.8)
WBC # FLD AUTO: 8.22 K/UL — SIGNIFICANT CHANGE UP (ref 4.8–10.8)

## 2020-08-24 PROCEDURE — 99291 CRITICAL CARE FIRST HOUR: CPT

## 2020-08-24 PROCEDURE — 88311 DECALCIFY TISSUE: CPT | Mod: 26

## 2020-08-24 PROCEDURE — 88304 TISSUE EXAM BY PATHOLOGIST: CPT | Mod: 26

## 2020-08-24 PROCEDURE — 93010 ELECTROCARDIOGRAM REPORT: CPT

## 2020-08-24 RX ORDER — CLEVIDIPINE BUTYRATE 50MG/100ML
1 VIAL (ML) INTRAVENOUS
Qty: 25 | Refills: 0 | Status: DISCONTINUED | OUTPATIENT
Start: 2020-08-24 | End: 2020-08-25

## 2020-08-24 RX ORDER — HYDROCHLOROTHIAZIDE 25 MG
25 TABLET ORAL DAILY
Refills: 0 | Status: DISCONTINUED | OUTPATIENT
Start: 2020-08-25 | End: 2020-08-26

## 2020-08-24 RX ORDER — PREGABALIN 225 MG/1
500 CAPSULE ORAL DAILY
Refills: 0 | Status: DISCONTINUED | OUTPATIENT
Start: 2020-08-25 | End: 2020-08-26

## 2020-08-24 RX ORDER — OXYCODONE HYDROCHLORIDE 5 MG/1
10 TABLET ORAL EVERY 6 HOURS
Refills: 0 | Status: DISCONTINUED | OUTPATIENT
Start: 2020-08-24 | End: 2020-08-26

## 2020-08-24 RX ORDER — HYDROCHLOROTHIAZIDE 25 MG
25 TABLET ORAL DAILY
Refills: 0 | Status: DISCONTINUED | OUTPATIENT
Start: 2020-08-24 | End: 2020-08-24

## 2020-08-24 RX ORDER — MEPERIDINE HYDROCHLORIDE 50 MG/ML
12.5 INJECTION INTRAMUSCULAR; INTRAVENOUS; SUBCUTANEOUS
Refills: 0 | Status: DISCONTINUED | OUTPATIENT
Start: 2020-08-24 | End: 2020-08-24

## 2020-08-24 RX ORDER — LOSARTAN POTASSIUM 100 MG/1
100 TABLET, FILM COATED ORAL DAILY
Refills: 0 | Status: DISCONTINUED | OUTPATIENT
Start: 2020-08-25 | End: 2020-08-26

## 2020-08-24 RX ORDER — AMLODIPINE BESYLATE 2.5 MG/1
5 TABLET ORAL ONCE
Refills: 0 | Status: COMPLETED | OUTPATIENT
Start: 2020-08-24 | End: 2020-08-24

## 2020-08-24 RX ORDER — MORPHINE SULFATE 50 MG/1
4 CAPSULE, EXTENDED RELEASE ORAL
Refills: 0 | Status: DISCONTINUED | OUTPATIENT
Start: 2020-08-24 | End: 2020-08-24

## 2020-08-24 RX ORDER — ASPIRIN/CALCIUM CARB/MAGNESIUM 324 MG
81 TABLET ORAL DAILY
Refills: 0 | Status: DISCONTINUED | OUTPATIENT
Start: 2020-08-25 | End: 2020-08-26

## 2020-08-24 RX ORDER — CHOLECALCIFEROL (VITAMIN D3) 125 MCG
5000 CAPSULE ORAL DAILY
Refills: 0 | Status: DISCONTINUED | OUTPATIENT
Start: 2020-08-24 | End: 2020-08-24

## 2020-08-24 RX ORDER — SENNA PLUS 8.6 MG/1
2 TABLET ORAL DAILY
Refills: 0 | Status: DISCONTINUED | OUTPATIENT
Start: 2020-08-24 | End: 2020-08-24

## 2020-08-24 RX ORDER — ACETAMINOPHEN 500 MG
650 TABLET ORAL EVERY 6 HOURS
Refills: 0 | Status: DISCONTINUED | OUTPATIENT
Start: 2020-08-24 | End: 2020-08-26

## 2020-08-24 RX ORDER — POTASSIUM CHLORIDE 20 MEQ
20 PACKET (EA) ORAL
Refills: 0 | Status: COMPLETED | OUTPATIENT
Start: 2020-08-24 | End: 2020-08-25

## 2020-08-24 RX ORDER — SODIUM CHLORIDE 9 MG/ML
1000 INJECTION, SOLUTION INTRAVENOUS
Refills: 0 | Status: DISCONTINUED | OUTPATIENT
Start: 2020-08-24 | End: 2020-08-24

## 2020-08-24 RX ORDER — DEXTROSE MONOHYDRATE, SODIUM CHLORIDE, AND POTASSIUM CHLORIDE 50; .745; 4.5 G/1000ML; G/1000ML; G/1000ML
1000 INJECTION, SOLUTION INTRAVENOUS
Refills: 0 | Status: DISCONTINUED | OUTPATIENT
Start: 2020-08-24 | End: 2020-08-24

## 2020-08-24 RX ORDER — AMLODIPINE BESYLATE 2.5 MG/1
5 TABLET ORAL AT BEDTIME
Refills: 0 | Status: DISCONTINUED | OUTPATIENT
Start: 2020-08-24 | End: 2020-08-24

## 2020-08-24 RX ORDER — LABETALOL HCL 100 MG
10 TABLET ORAL ONCE
Refills: 0 | Status: COMPLETED | OUTPATIENT
Start: 2020-08-24 | End: 2020-08-24

## 2020-08-24 RX ORDER — CEFAZOLIN SODIUM 1 G
VIAL (EA) INJECTION
Refills: 0 | Status: DISCONTINUED | OUTPATIENT
Start: 2020-08-24 | End: 2020-08-24

## 2020-08-24 RX ORDER — OXYCODONE HYDROCHLORIDE 5 MG/1
5 TABLET ORAL EVERY 6 HOURS
Refills: 0 | Status: DISCONTINUED | OUTPATIENT
Start: 2020-08-24 | End: 2020-08-26

## 2020-08-24 RX ORDER — ATORVASTATIN CALCIUM 80 MG/1
20 TABLET, FILM COATED ORAL AT BEDTIME
Refills: 0 | Status: DISCONTINUED | OUTPATIENT
Start: 2020-08-24 | End: 2020-08-24

## 2020-08-24 RX ORDER — ASPIRIN/CALCIUM CARB/MAGNESIUM 324 MG
81 TABLET ORAL DAILY
Refills: 0 | Status: DISCONTINUED | OUTPATIENT
Start: 2020-08-24 | End: 2020-08-24

## 2020-08-24 RX ORDER — SENNA PLUS 8.6 MG/1
2 TABLET ORAL AT BEDTIME
Refills: 0 | Status: DISCONTINUED | OUTPATIENT
Start: 2020-08-24 | End: 2020-08-26

## 2020-08-24 RX ORDER — SODIUM CHLORIDE 9 MG/ML
1000 INJECTION, SOLUTION INTRAVENOUS
Refills: 0 | Status: DISCONTINUED | OUTPATIENT
Start: 2020-08-24 | End: 2020-08-25

## 2020-08-24 RX ORDER — CEFAZOLIN SODIUM 1 G
2000 VIAL (EA) INJECTION ONCE
Refills: 0 | Status: DISCONTINUED | OUTPATIENT
Start: 2020-08-24 | End: 2020-08-24

## 2020-08-24 RX ORDER — AMLODIPINE BESYLATE 2.5 MG/1
5 TABLET ORAL AT BEDTIME
Refills: 0 | Status: DISCONTINUED | OUTPATIENT
Start: 2020-08-25 | End: 2020-08-26

## 2020-08-24 RX ORDER — ATORVASTATIN CALCIUM 80 MG/1
20 TABLET, FILM COATED ORAL AT BEDTIME
Refills: 0 | Status: DISCONTINUED | OUTPATIENT
Start: 2020-08-25 | End: 2020-08-26

## 2020-08-24 RX ORDER — LABETALOL HCL 100 MG
10 TABLET ORAL
Refills: 0 | Status: DISCONTINUED | OUTPATIENT
Start: 2020-08-24 | End: 2020-08-24

## 2020-08-24 RX ORDER — OXYCODONE HYDROCHLORIDE 5 MG/1
5 TABLET ORAL ONCE
Refills: 0 | Status: DISCONTINUED | OUTPATIENT
Start: 2020-08-24 | End: 2020-08-24

## 2020-08-24 RX ORDER — LOSARTAN POTASSIUM 100 MG/1
100 TABLET, FILM COATED ORAL DAILY
Refills: 0 | Status: DISCONTINUED | OUTPATIENT
Start: 2020-08-24 | End: 2020-08-24

## 2020-08-24 RX ORDER — MAGNESIUM SULFATE 500 MG/ML
2 VIAL (ML) INJECTION ONCE
Refills: 0 | Status: COMPLETED | OUTPATIENT
Start: 2020-08-24 | End: 2020-08-24

## 2020-08-24 RX ORDER — PREGABALIN 225 MG/1
500 CAPSULE ORAL DAILY
Refills: 0 | Status: DISCONTINUED | OUTPATIENT
Start: 2020-08-24 | End: 2020-08-24

## 2020-08-24 RX ADMIN — Medication 25 GRAM(S): at 20:49

## 2020-08-24 RX ADMIN — Medication 10 MILLIGRAM(S): at 23:36

## 2020-08-24 RX ADMIN — SODIUM CHLORIDE 100 MILLILITER(S): 9 INJECTION, SOLUTION INTRAVENOUS at 19:26

## 2020-08-24 RX ADMIN — Medication 10 MILLIGRAM(S): at 21:35

## 2020-08-24 RX ADMIN — Medication 50 MILLIEQUIVALENT(S): at 21:04

## 2020-08-24 RX ADMIN — AMLODIPINE BESYLATE 5 MILLIGRAM(S): 2.5 TABLET ORAL at 22:15

## 2020-08-24 RX ADMIN — MORPHINE SULFATE 4 MILLIGRAM(S): 50 CAPSULE, EXTENDED RELEASE ORAL at 21:04

## 2020-08-24 RX ADMIN — Medication 2 MG/HR: at 23:36

## 2020-08-24 RX ADMIN — Medication 650 MILLIGRAM(S): at 23:36

## 2020-08-24 RX ADMIN — Medication 50 MILLIEQUIVALENT(S): at 22:18

## 2020-08-24 RX ADMIN — Medication 650 MILLIGRAM(S): at 23:58

## 2020-08-24 RX ADMIN — MORPHINE SULFATE 4 MILLIGRAM(S): 50 CAPSULE, EXTENDED RELEASE ORAL at 20:15

## 2020-08-24 NOTE — CONSULT NOTE ADULT - ATTENDING COMMENTS
seen in pacu immediately PO   doing well   BP control   resume diet   IVL   d/c Marta at [facility]   admit To SICU

## 2020-08-24 NOTE — CONSULT NOTE ADULT - SUBJECTIVE AND OBJECTIVE BOX
SICU Consultation Note  ======================================================================================================  DARRIAN STEWART  [id number]      65y Female    63 yo female presents s/p smoking cessation, "in 2/2020 my bp went [place]& i came to the  hospital&  i saw vascular& cardiologist, the ultrasound showed that the right side of my neck is blocked" pt is scheduled for rcea  denies chest pain, palpitations, shortness of breath, dyspnea, or dysuria. exercise tolerance: 2 blocks/ flights of stairs w/o sob  pt denies any known exposure to COVID-19, denies any S&S  pt instructed re: self quarantine guidelines    Procedure: right CEA  OR Stats  OR Time: 3hrs             EBL:    25cc      IV Fluids:  2500cc     Blood Products:  None              UOP:    500cc w/ raymundo    PMH  PAST MEDICAL & SURGICAL HISTORY:  Carotid stenosis  Pneumonia  H/O osteopenia  High cholesterol  HTN (hypertension)  No significant past surgical history      Home Meds:  Home Medications:  amLODIPine 10 mg oral tablet: 0.5 tab(s) orally once a day (at bedtime) (24 Aug 2020 11:30)  aspirin 81 mg oral tablet: orally 2 times a day (24 Aug 2020 11:30)  hydroCHLOROthiazide 25 mg oral tablet: 1 tab(s) orally once a day (24 Aug 2020 11:30)  losartan 100 mg oral tablet: 1 tab(s) orally once a day (24 Aug 2020 11:30)  Probiotic Formula oral capsule: 1 cap(s) orally once a day (24 Aug 2020 11:30)  rosuvastatin 5 mg oral tablet: 1 tab(s) orally once a day (24 Aug 2020 11:30)  Vitamin B12 500 mcg oral tablet: 1 tab(s) orally once a day (24 Aug 2020 11:30)  Vitamin D3 5000 intl units (125 mcg) oral tablet: orally once a day (24 Aug 2020 11:30)         Allergies  Allergies    No Known Allergies    Intolerances         Current Medications:  labetalol Injectable 10 milliGRAM(s) IV Push every 10 minutes PRN hypertension  lactated ringers. 1000 milliLiter(s) (100 mL/Hr) IV Continuous <Continuous>  meperidine     Injectable 12.5 milliGRAM(s) IV Push every 10 minutes PRN Shivering  morphine  - Injectable 4 milliGRAM(s) IV Push every 10 minutes PRN Severe Pain (7 - 10)  oxyCODONE    IR 5 milliGRAM(s) Oral once PRN Moderate Pain (4 - 6)  senna 2 Tablet(s) Oral daily PRN Constipation      Advanced Directives: Presumed Full Code    VITAL SIGNS, INS/OUTS (Last 24hours):    ICU Vital Signs Last 24 Hrs  T(C): 36.7 (24 Aug 2020 18:03), Max: 36.7 (24 Aug 2020 18:03)  T(F): 98 (24 Aug 2020 18:03), Max: 98 (24 Aug 2020 18:03)  HR: 75 (24 Aug 2020 18:30) (72 - 75)  BP: 104/55 (24 Aug 2020 18:30) (103/53 - 157/68)  BP(mean): 78 (24 Aug 2020 18:30) (72 - 78)  ABP: 112/43 (24 Aug 2020 18:30) (112/43 - 129/54)  ABP(mean): 64 (24 Aug 2020 18:30) (64 - 77)  RR: 16 (24 Aug 2020 18:30) (12 - 18)  SpO2: 98% (24 Aug 2020 18:30) (98% - 98%)    I&O's Summary      Height (cm): 157.48 (08-24-20)  Weight (kg): 68.9 (08-24-20)  BMI (kg/m2): 27.8 (08-24-20)  BSA (m2): 1.7 (08-24-20)    Physical Exam:   ---------------------------------------------------------------------------------------  GCS:      Exam: A&Ox3, no focal deficits    RESPIRATORY:  Normal expansion/effort  Mechanical Ventilation:     CARDIOVASCULAR:   S1/S2.  RRR  No peripheral edema    GASTROINTESTINAL:  Abdomen soft, non-tender, non-distended    MUSCULOSKELETAL:  Extremities warm, pink, well-perfused.    DERM:  No skin breakdown     :   Exam: Raymundo catheter in place.       Tubes/Lines/Drains   ----------------------------------------------------------------------------------------------------------  [x] Peripheral IV  Arterial line Right radial 18guage  [X] Urinary Catheter Raymundo                                             Date Placed:  8/24/20 SICU Consultation Note  ======================================================================================================  DARRIAN STEWART  [id number]      65y Female  pmh of HLD, HT, osteopenia, former smoker (quit Feb 2020), COVID 8/21 negative, presents today for elective right CEA. Patient was found to have an incidental findings on US outpatient when she was seen for HTN that showed 80% stenosis.           CT Heart with Coronaries (04.30.20 @ 11:17) >  Atherosclerotic disease within the mid LAD and proximal right coronary contributing to mild stenosis.  Patent remaining coronary arteries without significant plaque or stenosis.      Procedure: right CEA  OR Stats  OR Time: 3hrs             EBL:    25cc      IV Fluids:  2500cc     Blood Products:  None              UOP:    500cc w/ raymundo    PMH  PAST MEDICAL & SURGICAL HISTORY:  Carotid stenosis  Pneumonia  H/O osteopenia  High cholesterol  HTN (hypertension)  No significant past surgical history      Home Meds:  Home Medications:  amLODIPine 10 mg oral tablet: 0.5 tab(s) orally once a day (at bedtime) (24 Aug 2020 11:30)  aspirin 81 mg oral tablet: orally 2 times a day (24 Aug 2020 11:30)  hydroCHLOROthiazide 25 mg oral tablet: 1 tab(s) orally once a day (24 Aug 2020 11:30)  losartan 100 mg oral tablet: 1 tab(s) orally once a day (24 Aug 2020 11:30)  Probiotic Formula oral capsule: 1 cap(s) orally once a day (24 Aug 2020 11:30)  rosuvastatin 5 mg oral tablet: 1 tab(s) orally once a day (24 Aug 2020 11:30)  Vitamin B12 500 mcg oral tablet: 1 tab(s) orally once a day (24 Aug 2020 11:30)  Vitamin D3 5000 intl units (125 mcg) oral tablet: orally once a day (24 Aug 2020 11:30)         Allergies  Allergies    No Known Allergies    Intolerances         Current Medications:  labetalol Injectable 10 milliGRAM(s) IV Push every 10 minutes PRN hypertension  lactated ringers. 1000 milliLiter(s) (100 mL/Hr) IV Continuous <Continuous>  meperidine     Injectable 12.5 milliGRAM(s) IV Push every 10 minutes PRN Shivering  morphine  - Injectable 4 milliGRAM(s) IV Push every 10 minutes PRN Severe Pain (7 - 10)  oxyCODONE    IR 5 milliGRAM(s) Oral once PRN Moderate Pain (4 - 6)  senna 2 Tablet(s) Oral daily PRN Constipation      Advanced Directives: Presumed Full Code    VITAL SIGNS, INS/OUTS (Last 24hours):    ICU Vital Signs Last 24 Hrs  T(C): 36.7 (24 Aug 2020 18:03), Max: 36.7 (24 Aug 2020 18:03)  T(F): 98 (24 Aug 2020 18:03), Max: 98 (24 Aug 2020 18:03)  HR: 75 (24 Aug 2020 18:30) (72 - 75)  BP: 104/55 (24 Aug 2020 18:30) (103/53 - 157/68)  BP(mean): 78 (24 Aug 2020 18:30) (72 - 78)  ABP: 112/43 (24 Aug 2020 18:30) (112/43 - 129/54)  ABP(mean): 64 (24 Aug 2020 18:30) (64 - 77)  RR: 16 (24 Aug 2020 18:30) (12 - 18)  SpO2: 98% (24 Aug 2020 18:30) (98% - 98%)    I&O's Summary      Height (cm): 157.48 (08-24-20)  Weight (kg): 68.9 (08-24-20)  BMI (kg/m2): 27.8 (08-24-20)  BSA (m2): 1.7 (08-24-20)    Physical Exam:   ---------------------------------------------------------------------------------------  GCS:      Exam: A&Ox3, no focal deficits    RESPIRATORY:  Normal expansion/effort  Mechanical Ventilation:     CARDIOVASCULAR:   S1/S2.  RRR  No peripheral edema    GASTROINTESTINAL:  Abdomen soft, non-tender, non-distended    MUSCULOSKELETAL:  Extremities warm, pink, well-perfused.    DERM:  No skin breakdown     :   Exam: Raymundo catheter in place.       Tubes/Lines/Drains   ----------------------------------------------------------------------------------------------------------  [x] Peripheral IV  Arterial line Right radial 18guage  [X] Urinary Catheter Raymundo                                             Date Placed:  8/24/20 SICU Consultation Note  ======================================================================================================  DARRIAN STEWART  [id number]      65y Female  pmh of HLD, HT, osteopenia, former smoker (quit Feb 2020), COVID 8/21 negative, presents today for elective right CEA. Patient was found to have an incidental findings on US outpatient when she was seen for HTN that showed R ICA 80% stenosis. Of note Pt took her losartan and   Intra-op pt received 2gm Ancef, pt required pushes of vasopressors during the case to keep SBP >160, then the pt was hypertensive and received metoprolol 5, enalapril 1.25mg, hydralazine 10mg x2. Post op pt had no neurological deficits, was hypertensive, received labetalol 10mg x1 and received her home dose of amlodipine.           CT Heart with Coronaries (04.30.20 @ 11:17) >  Atherosclerotic disease within the mid LAD and proximal right coronary contributing to mild stenosis.  Patent remaining coronary arteries without significant plaque or stenosis.      Procedure: right CEA  OR Stats  OR Time: 3hrs             EBL:    25cc      IV Fluids:  2500cc     Blood Products:  None              UOP:    500cc w/ raymundo    PMH  PAST MEDICAL & SURGICAL HISTORY:  Carotid stenosis  Pneumonia  H/O osteopenia  High cholesterol  HTN (hypertension)  No significant past surgical history      Home Meds:  Home Medications:  amLODIPine 10 mg oral tablet: 0.5 tab(s) orally once a day (at bedtime) (24 Aug 2020 11:30)  aspirin 81 mg oral tablet: orally 2 times a day (24 Aug 2020 11:30)  hydroCHLOROthiazide 25 mg oral tablet: 1 tab(s) orally once a day (24 Aug 2020 11:30)  losartan 100 mg oral tablet: 1 tab(s) orally once a day (24 Aug 2020 11:30)  Probiotic Formula oral capsule: 1 cap(s) orally once a day (24 Aug 2020 11:30)  rosuvastatin 5 mg oral tablet: 1 tab(s) orally once a day (24 Aug 2020 11:30)  Vitamin B12 500 mcg oral tablet: 1 tab(s) orally once a day (24 Aug 2020 11:30)  Vitamin D3 5000 intl units (125 mcg) oral tablet: orally once a day (24 Aug 2020 11:30)         Allergies  Allergies    No Known Allergies    Intolerances         Current Medications:  labetalol Injectable 10 milliGRAM(s) IV Push every 10 minutes PRN hypertension  lactated ringers. 1000 milliLiter(s) (100 mL/Hr) IV Continuous <Continuous>  meperidine     Injectable 12.5 milliGRAM(s) IV Push every 10 minutes PRN Shivering  morphine  - Injectable 4 milliGRAM(s) IV Push every 10 minutes PRN Severe Pain (7 - 10)  oxyCODONE    IR 5 milliGRAM(s) Oral once PRN Moderate Pain (4 - 6)  senna 2 Tablet(s) Oral daily PRN Constipation      Advanced Directives: Presumed Full Code    VITAL SIGNS, INS/OUTS (Last 24hours):    ICU Vital Signs Last 24 Hrs  T(C): 36.7 (24 Aug 2020 18:03), Max: 36.7 (24 Aug 2020 18:03)  T(F): 98 (24 Aug 2020 18:03), Max: 98 (24 Aug 2020 18:03)  HR: 75 (24 Aug 2020 18:30) (72 - 75)  BP: 104/55 (24 Aug 2020 18:30) (103/53 - 157/68)  BP(mean): 78 (24 Aug 2020 18:30) (72 - 78)  ABP: 112/43 (24 Aug 2020 18:30) (112/43 - 129/54)  ABP(mean): 64 (24 Aug 2020 18:30) (64 - 77)  RR: 16 (24 Aug 2020 18:30) (12 - 18)  SpO2: 98% (24 Aug 2020 18:30) (98% - 98%)    I&O's Summary      Height (cm): 157.48 (08-24-20)  Weight (kg): 68.9 (08-24-20)  BMI (kg/m2): 27.8 (08-24-20)  BSA (m2): 1.7 (08-24-20)    Physical Exam:   ---------------------------------------------------------------------------------------  GCS:      Exam: A&Ox3, no focal deficits  R neck dressing, swollen, no erythema or hematoma palpated     RESPIRATORY:  Normal expansion/effort      CARDIOVASCULAR:   S1/S2.  RRR  No peripheral edema    GASTROINTESTINAL:  Abdomen soft, non-tender, non-distended    MUSCULOSKELETAL:  Extremities warm, pink, well-perfused. palpable dp b/l    DERM:  No skin breakdown     :   Exam: Raymundo catheter in place.       Tubes/Lines/Drains   ----------------------------------------------------------------------------------------------------------  [x] Peripheral IV  Arterial line Right radial 18guage  [X] Urinary Catheter Raymundo                                             Date Placed:  8/24/20 SICU Consultation Note  ======================================================================================================  DARRIAN STEWART  [id number]    65y Female  pmh of HLD, HT, osteopenia, former smoker (quit Feb 2020), COVID 8/21 negative, presents today for elective right CEA. Patient was found to have an incidental findings on US outpatient when she was seen for HTN that showed R ICA 80% stenosis. Of note Pt took her losartan and   Intra-op pt received 2gm Ancef, pt required pushes of vasopressors during the case to keep SBP >160, then the pt was hypertensive and received metoprolol 5, enalapril 1.25mg, hydralazine 10mg x2. Post op pt had no neurological deficits, was hypertensive, received labetalol 10mg x1 and received her home dose of amlodipine.           CT Heart with Coronaries (04.30.20 @ 11:17) >  Atherosclerotic disease within the mid LAD and proximal right coronary contributing to mild stenosis.  Patent remaining coronary arteries without significant plaque or stenosis.      Procedure: right CEA  OR Stats  OR Time: 3hrs             EBL:    25cc      IV Fluids:  2500cc     Blood Products:  None              UOP:    500cc w/ raymundo    PMH  PAST MEDICAL & SURGICAL HISTORY:  Carotid stenosis  Pneumonia  H/O osteopenia  High cholesterol  HTN (hypertension)  No significant past surgical history      Home Meds:  Home Medications:  amLODIPine 10 mg oral tablet: 0.5 tab(s) orally once a day (at bedtime) (24 Aug 2020 11:30)  aspirin 81 mg oral tablet: orally 2 times a day (24 Aug 2020 11:30)  hydroCHLOROthiazide 25 mg oral tablet: 1 tab(s) orally once a day (24 Aug 2020 11:30)  losartan 100 mg oral tablet: 1 tab(s) orally once a day (24 Aug 2020 11:30)  Probiotic Formula oral capsule: 1 cap(s) orally once a day (24 Aug 2020 11:30)  rosuvastatin 5 mg oral tablet: 1 tab(s) orally once a day (24 Aug 2020 11:30)  Vitamin B12 500 mcg oral tablet: 1 tab(s) orally once a day (24 Aug 2020 11:30)  Vitamin D3 5000 intl units (125 mcg) oral tablet: orally once a day (24 Aug 2020 11:30)         Allergies  Allergies    No Known Allergies    Intolerances         Current Medications:  labetalol Injectable 10 milliGRAM(s) IV Push every 10 minutes PRN hypertension  lactated ringers. 1000 milliLiter(s) (100 mL/Hr) IV Continuous <Continuous>  meperidine     Injectable 12.5 milliGRAM(s) IV Push every 10 minutes PRN Shivering  morphine  - Injectable 4 milliGRAM(s) IV Push every 10 minutes PRN Severe Pain (7 - 10)  oxyCODONE    IR 5 milliGRAM(s) Oral once PRN Moderate Pain (4 - 6)  senna 2 Tablet(s) Oral daily PRN Constipation      Advanced Directives: Presumed Full Code    VITAL SIGNS, INS/OUTS (Last 24hours):    ICU Vital Signs Last 24 Hrs  T(C): 36.7 (24 Aug 2020 18:03), Max: 36.7 (24 Aug 2020 18:03)  T(F): 98 (24 Aug 2020 18:03), Max: 98 (24 Aug 2020 18:03)  HR: 75 (24 Aug 2020 18:30) (72 - 75)  BP: 104/55 (24 Aug 2020 18:30) (103/53 - 157/68)  BP(mean): 78 (24 Aug 2020 18:30) (72 - 78)  ABP: 112/43 (24 Aug 2020 18:30) (112/43 - 129/54)  ABP(mean): 64 (24 Aug 2020 18:30) (64 - 77)  RR: 16 (24 Aug 2020 18:30) (12 - 18)  SpO2: 98% (24 Aug 2020 18:30) (98% - 98%)    I&O's Summary      Height (cm): 157.48 (08-24-20)  Weight (kg): 68.9 (08-24-20)  BMI (kg/m2): 27.8 (08-24-20)  BSA (m2): 1.7 (08-24-20)    Physical Exam:   ---------------------------------------------------------------------------------------  GCS:      Exam: A&Ox3, no focal deficits  R neck dressing, swollen, no erythema or hematoma palpated     RESPIRATORY:  Normal expansion/effort      CARDIOVASCULAR:   S1/S2.  RRR  No peripheral edema    GASTROINTESTINAL:  Abdomen soft, non-tender, non-distended    MUSCULOSKELETAL:  Extremities warm, pink, well-perfused. palpable dp b/l    DERM:  No skin breakdown     :   Exam: Raymundo catheter in place.       Tubes/Lines/Drains   ----------------------------------------------------------------------------------------------------------  [x] Peripheral IV  Arterial line Right radial 18guage  [X] Urinary Catheter Raymundo                                             Date Placed:  8/24/20 SICU Consultation Note  ======================================================================================================  DARRIAN STEWART  [id number]    65y Female  pmh of HLD, HT, osteopenia, former smoker (quit Feb 2020), COVID 8/21 negative, presents today for elective right CEA. Patient was found to have an incidental findings on US outpatient when she was seen for HTN that showed R ICA 80% stenosis. Of note Pt took her losartan and   Intra-op pt received 2gm Ancef, pt required pushes of vasopressors during the case to keep SBP >160, then the pt was hypertensive and received metoprolol 5, enalapril 1.25mg, hydralazine 10mg x2. Post op pt had no neurological deficits, was hypertensive, received labetalol 10mg x1 and received her home dose of amlodipine.     Cardiologist - [place]      CT Heart with Coronaries (04.30.20 @ 11:17) >  Atherosclerotic disease within the mid LAD and proximal right coronary contributing to mild stenosis.  Patent remaining coronary arteries without significant plaque or stenosis.      Procedure: right CEA  OR Stats  OR Time: 3hrs             EBL:    25cc      IV Fluids:  2500cc     Blood Products:  None              UOP:    500cc w/ raymundo    PMH  PAST MEDICAL & SURGICAL HISTORY:  Carotid stenosis  Pneumonia  H/O osteopenia  High cholesterol  HTN (hypertension)  No significant past surgical history      Home Meds:  Home Medications:  amLODIPine 10 mg oral tablet: 0.5 tab(s) orally once a day (at bedtime) (24 Aug 2020 11:30)  aspirin 81 mg oral tablet: orally 2 times a day (24 Aug 2020 11:30)  hydroCHLOROthiazide 25 mg oral tablet: 1 tab(s) orally once a day (24 Aug 2020 11:30)  losartan 100 mg oral tablet: 1 tab(s) orally once a day (24 Aug 2020 11:30)  Probiotic Formula oral capsule: 1 cap(s) orally once a day (24 Aug 2020 11:30)  rosuvastatin 5 mg oral tablet: 1 tab(s) orally once a day (24 Aug 2020 11:30)  Vitamin B12 500 mcg oral tablet: 1 tab(s) orally once a day (24 Aug 2020 11:30)  Vitamin D3 5000 intl units (125 mcg) oral tablet: orally once a day (24 Aug 2020 11:30)         Allergies  Allergies    No Known Allergies    Intolerances         Current Medications:  labetalol Injectable 10 milliGRAM(s) IV Push every 10 minutes PRN hypertension  lactated ringers. 1000 milliLiter(s) (100 mL/Hr) IV Continuous <Continuous>  meperidine     Injectable 12.5 milliGRAM(s) IV Push every 10 minutes PRN Shivering  morphine  - Injectable 4 milliGRAM(s) IV Push every 10 minutes PRN Severe Pain (7 - 10)  oxyCODONE    IR 5 milliGRAM(s) Oral once PRN Moderate Pain (4 - 6)  senna 2 Tablet(s) Oral daily PRN Constipation      Advanced Directives: Presumed Full Code    VITAL SIGNS, INS/OUTS (Last 24hours):    ICU Vital Signs Last 24 Hrs  T(C): 36.7 (24 Aug 2020 18:03), Max: 36.7 (24 Aug 2020 18:03)  T(F): 98 (24 Aug 2020 18:03), Max: 98 (24 Aug 2020 18:03)  HR: 75 (24 Aug 2020 18:30) (72 - 75)  BP: 104/55 (24 Aug 2020 18:30) (103/53 - 157/68)  BP(mean): 78 (24 Aug 2020 18:30) (72 - 78)  ABP: 112/43 (24 Aug 2020 18:30) (112/43 - 129/54)  ABP(mean): 64 (24 Aug 2020 18:30) (64 - 77)  RR: 16 (24 Aug 2020 18:30) (12 - 18)  SpO2: 98% (24 Aug 2020 18:30) (98% - 98%)    I&O's Summary      Height (cm): 157.48 (08-24-20)  Weight (kg): 68.9 (08-24-20)  BMI (kg/m2): 27.8 (08-24-20)  BSA (m2): 1.7 (08-24-20)    Physical Exam:   ---------------------------------------------------------------------------------------  GCS:      Exam: A&Ox3, no focal deficits  R neck dressing, swollen, no erythema or hematoma palpated     RESPIRATORY:  Normal expansion/effort      CARDIOVASCULAR:   S1/S2.  RRR  No peripheral edema    GASTROINTESTINAL:  Abdomen soft, non-tender, non-distended    MUSCULOSKELETAL:  Extremities warm, pink, well-perfused. palpable dp b/l    DERM:  No skin breakdown     :   Exam: Raymundo catheter in place.       Tubes/Lines/Drains   ----------------------------------------------------------------------------------------------------------  [x] Peripheral IV  Arterial line Right radial 18guage  [X] Urinary Catheter Raymundo                                             Date Placed:  8/24/20

## 2020-08-24 NOTE — CONSULT NOTE ADULT - ASSESSMENT
Assessment & Plan    65y Female  s/p right carotid endarterectomy    NEURO:    Acute pain-controlled with     RESP:     Oxygen insufficiency-wean off NC to RA as tolerate    Activity-      CARDS:     Imaging:     Labs:   labetalol Injectable 10 every 10 minutes PRN      GI/NUTR:     Diet  Diet, NPO      GI Prophylaxis-    Bowel regimen-  senna 2 Tablet(s) Oral daily PRN      /RENAL:     Strict I/O-raymundo in place, will d/w vascular 8/25 regarding removal    BUN/Cr-pr       HEME/ONC:     DVT prophylaxis-holding as per vascular fellow Dr. Fishman, to restart 8/25 AM    Antiplatelet therapy-patient took ASA this AM, will continue 8/25 AM    Intraop 9000u heparin, monitor post operative H/H    ID:    antibiotics-2more doses of cefotetan pending       ENDO:    Glucose-start ISS if FSG >160       LINES/DRAINS:  Lis DURÁN Foley     DISPO:    SICU will d/w Dr. Mcdowell Assessment & Plan    65y Female  s/p right carotid endarterectomy    NEURO:    Acute pain-controlled with oxy, dilaudid PRN   s/p right cea-neurochecks q1, no postoperative defecits as per vascular    RESP:     Oxygen insufficiency-wean off NC to RA as tolerate    Activity-bedrest until 8/25 AM      CARDS:     s/p CEA-SBP     hx of HTN-restarted amlodipine, hctz, losartan    hx of HLD-restarted home statin    Imaging: post op EKG pending, preop EKG NSR    Labs: Cardiac enzymes x3 pending      GI/NUTR:     Diet-NPO, start clear 4-5hrs post operative if no n/v    GI Prophylaxis-PPI    Bowel regimen-senna 2 Tablet(s) Oral daily PRN    /RENAL:     Strict I/O-raymundo in place, will d/w vascular 8/25 regarding removal    BUN/Cr-pending    HEME/ONC:     DVT prophylaxis-holding as per vascular fellow Dr. Fishman, to restart 8/25 AM    Antiplatelet therapy-patient took ASA this AM, will continue 8/25 AM    Intraop 9000u heparin, monitor post operative H/H    ID:    antibiotics-2more doses of cefotetan pending       ENDO:    Glucose-start ISS if FSG >160       LINES/DRAINS:  Lis DURÁN Foley     DISPO:    SICU will d/w Dr. Mcdowell Assessment & Plan    65y Female  s/p right carotid endarterectomy    NEURO:    Acute pain-controlled with  tylenol oxy prn   s/p right cea-neurochecks q1, no postoperative deficits  as per vascular    RESP:     Oxygen insufficiency-wean off NC to RA as tolerate    Activity-bedrest until 8/25 AM      CARDS:     s/p CEA-SBP     hx of HTN-restarted amlodipine, hctz, losartan    hx of HLD-restarted home statin    Imaging: post op EKG pending, preop EKG NSR    Labs: Cardiac enzymes x3 pending      GI/NUTR:     Diet-NPO, start clear 4-5hrs post operative if no n/v    GI Prophylaxis-PPI    Bowel regimen-senna 2 Tablet(s) Oral daily PRN    /RENAL:     Strict I/O-raymundo in place, will d/w vascular 8/25 regarding removal    BUN/Cr-pending    HEME/ONC:     DVT prophylaxis-holding as per vascular fellow Dr. Fishman, to restart 8/25 AM    Antiplatelet therapy-patient took ASA this AM, will continue 8/25 AM    Intraop 9000u heparin, monitor post operative H/H    ID:    antibiotics-2more doses of cefotetan pending       ENDO:    Glucose-start ISS if FSG >160       LINES/DRAINS:  Lis DURÁN Foley     DISPO:    SICU will d/w Dr. Mcdowell

## 2020-08-24 NOTE — CHART NOTE - NSCHARTNOTEFT_GEN_A_CORE
PACU ANESTHESIA ADMISSION NOTE      Procedure:   Post op diagnosis:      ____  Intubated  TV:______       Rate: ______      FiO2: ______    _x___  Patent Airway    _x___  Full return of protective reflexes    _x___  Full recovery from anesthesia / back to baseline status    Vitals: see anesth record    Mental Status:  _x___ Awake   _____ Alert   _____ Drowsy   _____ Sedated    Nausea/Vomiting:  _x___  NO       ______Yes,   See Post - Op Orders         Pain Scale (0-10):  __0___    Treatment: _x___ None    ____ See Post - Op/PCA Orders    Post - Operative Fluids:  x___ See Post - Op Orders    Plan: Discharge:   _Critical Care    ___x__  Other:_________________    Comments:  No anesthesia issues or complications noted.  Discharge when criteria met.

## 2020-08-24 NOTE — PRE-ANESTHESIA EVALUATION ADULT - NSPREOPDXFT_GEN_ALL_CORE
Carotid Stenosis pt seen by neurology  to have mri ordered by him if neg ok for dc and to take extra dose of carbamazapine at 4 pm pt doing well resting no further seizure activity. results rev [facility] pt and family. pt wants to go home.  they are aware of the new seizure med regimine. and need for follow up

## 2020-08-25 ENCOUNTER — TRANSCRIPTION ENCOUNTER (OUTPATIENT)
Age: 65
End: 2020-08-25

## 2020-08-25 LAB
ANION GAP SERPL CALC-SCNC: 12 MMOL/L — SIGNIFICANT CHANGE UP (ref 7–14)
BUN SERPL-MCNC: 17 MG/DL — SIGNIFICANT CHANGE UP (ref 10–20)
CALCIUM SERPL-MCNC: 8.3 MG/DL — LOW (ref 8.5–10.1)
CHLORIDE SERPL-SCNC: 104 MMOL/L — SIGNIFICANT CHANGE UP (ref 98–110)
CK MB CFR SERPL CALC: 2.1 NG/ML — SIGNIFICANT CHANGE UP (ref 0.6–6.3)
CK MB CFR SERPL CALC: 2.1 NG/ML — SIGNIFICANT CHANGE UP (ref 0.6–6.3)
CK SERPL-CCNC: 133 U/L — SIGNIFICANT CHANGE UP (ref 0–225)
CK SERPL-CCNC: 148 U/L — SIGNIFICANT CHANGE UP (ref 0–225)
CO2 SERPL-SCNC: 23 MMOL/L — SIGNIFICANT CHANGE UP (ref 17–32)
CREAT SERPL-MCNC: 0.7 MG/DL — SIGNIFICANT CHANGE UP (ref 0.7–1.5)
GLUCOSE SERPL-MCNC: 172 MG/DL — HIGH (ref 70–99)
HCT VFR BLD CALC: 30.1 % — LOW (ref 37–47)
HGB BLD-MCNC: 10.3 G/DL — LOW (ref 12–16)
MAGNESIUM SERPL-MCNC: 2.3 MG/DL — SIGNIFICANT CHANGE UP (ref 1.8–2.4)
MCHC RBC-ENTMCNC: 30.5 PG — SIGNIFICANT CHANGE UP (ref 27–31)
MCHC RBC-ENTMCNC: 34.2 G/DL — SIGNIFICANT CHANGE UP (ref 32–37)
MCV RBC AUTO: 89.1 FL — SIGNIFICANT CHANGE UP (ref 81–99)
NRBC # BLD: 0 /100 WBCS — SIGNIFICANT CHANGE UP (ref 0–0)
PHOSPHATE SERPL-MCNC: 3.1 MG/DL — SIGNIFICANT CHANGE UP (ref 2.1–4.9)
PLATELET # BLD AUTO: 150 K/UL — SIGNIFICANT CHANGE UP (ref 130–400)
POTASSIUM SERPL-MCNC: 4 MMOL/L — SIGNIFICANT CHANGE UP (ref 3.5–5)
POTASSIUM SERPL-SCNC: 4 MMOL/L — SIGNIFICANT CHANGE UP (ref 3.5–5)
RBC # BLD: 3.38 M/UL — LOW (ref 4.2–5.4)
RBC # FLD: 13.1 % — SIGNIFICANT CHANGE UP (ref 11.5–14.5)
SODIUM SERPL-SCNC: 139 MMOL/L — SIGNIFICANT CHANGE UP (ref 135–146)
TROPONIN T SERPL-MCNC: <0.01 NG/ML — SIGNIFICANT CHANGE UP
TROPONIN T SERPL-MCNC: <0.01 NG/ML — SIGNIFICANT CHANGE UP
WBC # BLD: 6.51 K/UL — SIGNIFICANT CHANGE UP (ref 4.8–10.8)
WBC # FLD AUTO: 6.51 K/UL — SIGNIFICANT CHANGE UP (ref 4.8–10.8)

## 2020-08-25 PROCEDURE — 71045 X-RAY EXAM CHEST 1 VIEW: CPT | Mod: 26

## 2020-08-25 PROCEDURE — 99291 CRITICAL CARE FIRST HOUR: CPT

## 2020-08-25 RX ORDER — OXYCODONE HYDROCHLORIDE 5 MG/1
1 TABLET ORAL
Qty: 5 | Refills: 0
Start: 2020-08-25 | End: 2020-08-29

## 2020-08-25 RX ORDER — PANTOPRAZOLE SODIUM 20 MG/1
40 TABLET, DELAYED RELEASE ORAL
Refills: 0 | Status: DISCONTINUED | OUTPATIENT
Start: 2020-08-25 | End: 2020-08-26

## 2020-08-25 RX ORDER — OXYCODONE AND ACETAMINOPHEN 5; 325 MG/1; MG/1
1 TABLET ORAL
Qty: 12 | Refills: 0
Start: 2020-08-25 | End: 2020-08-27

## 2020-08-25 RX ADMIN — Medication 650 MILLIGRAM(S): at 12:00

## 2020-08-25 RX ADMIN — PREGABALIN 500 MICROGRAM(S): 225 CAPSULE ORAL at 11:57

## 2020-08-25 RX ADMIN — Medication 81 MILLIGRAM(S): at 11:58

## 2020-08-25 RX ADMIN — Medication 25 MILLIGRAM(S): at 05:41

## 2020-08-25 RX ADMIN — LOSARTAN POTASSIUM 100 MILLIGRAM(S): 100 TABLET, FILM COATED ORAL at 05:41

## 2020-08-25 RX ADMIN — AMLODIPINE BESYLATE 5 MILLIGRAM(S): 2.5 TABLET ORAL at 22:02

## 2020-08-25 RX ADMIN — SENNA PLUS 2 TABLET(S): 8.6 TABLET ORAL at 22:02

## 2020-08-25 RX ADMIN — PANTOPRAZOLE SODIUM 40 MILLIGRAM(S): 20 TABLET, DELAYED RELEASE ORAL at 11:57

## 2020-08-25 RX ADMIN — ATORVASTATIN CALCIUM 20 MILLIGRAM(S): 80 TABLET, FILM COATED ORAL at 22:02

## 2020-08-25 RX ADMIN — Medication 50 MILLIEQUIVALENT(S): at 00:45

## 2020-08-25 RX ADMIN — Medication 650 MILLIGRAM(S): at 23:50

## 2020-08-25 NOTE — PROGRESS NOTE ADULT - SUBJECTIVE AND OBJECTIVE BOX
Procedure: Status post right cea   Post Operative day #1      Patient resting comfortably no complaints   pmh:Carotid stenosis  Pneumonia  H/O osteopenia  High cholesterol  HTN (hypertension)  Stenosis of right carotid artery  Stenosis of right carotid artery  Endarterectomy, carotid, right  No significant past surgical history    No Known Allergies    acetaminophen   Tablet .. 650 milliGRAM(s) Oral every 6 hours  amLODIPine   Tablet 5 milliGRAM(s) Oral at bedtime  aspirin  chewable 81 milliGRAM(s) Oral daily  atorvastatin 20 milliGRAM(s) Oral at bedtime  clevidipine Infusion 1 mG/Hr IV Continuous <Continuous>  cyanocobalamin 500 MICROGram(s) Oral daily  hydrochlorothiazide 25 milliGRAM(s) Oral daily  lactated ringers. 1000 milliLiter(s) IV Continuous <Continuous>  losartan 100 milliGRAM(s) Oral daily  oxyCODONE    IR 5 milliGRAM(s) Oral every 6 hours PRN  oxyCODONE    IR 10 milliGRAM(s) Oral every 6 hours PRN  senna 2 Tablet(s) Oral at bedtime          T(C): 35.8 (20 @ 23:10), Max: 36.7 (20 @ 18:03)  HR: 71 (20 @ 01:00) (71 - 78)  BP: 126/60 (20 @ 22:00) (103/53 - 157/68)  RR: 17 (20 @ 01:00) (12 - 18)  SpO2: 98% (20 @ 01:00) (94% - 99%)    20 @ 07:01  -  20 @ 02:12  --------------------------------------------------------  IN: 820 mL / OUT: 1050 mL / NET: -230 mL        General:Alert and oriented times 3, not in acute distress   Neck: right cea incision clean dry intact soft mild swelling no bleeding   Heart: Regular rate and rhythm, no rubs , murmurs or gallops  Lungs: Clear to auscultation bilaterally, no wheezes, rales, rhonci appreciated  Abdomen: Soft , positive bowel sounds, no tenderness, no distention, no peritoneal signs   Exremites:  warm extremities,  good color, no swelling, motor and sensation , pulses   Neuro: neurologically intact                 10.3   8.22  )-----------( 155      (  @ 19:10 )             30.3                    138   |  103   |  19                 Ca: 7.9    BMP:   ----------------------------< 147    M.6   (20 @ 19:10)             3.2    |  22    | 0.8                Ph: 3.5                  CARDIAC MARKERS ( 25 Aug 2020 00:50 )  x     / <0.01 ng/mL / 133 U/L / x     / 2.1 ng/mL  CARDIAC MARKERS ( 24 Aug 2020 19:10 )  x     / <0.01 ng/mL / 81 U/L / x     / 2.0 ng/mL

## 2020-08-25 NOTE — PHYSICAL THERAPY INITIAL EVALUATION ADULT - ADDITIONAL COMMENTS
on Discharge, Pt is planning to stay with her son/daughter in law, in a PH using 1 step to enter and 12 indoor to bedroom

## 2020-08-25 NOTE — PHYSICAL THERAPY INITIAL EVALUATION ADULT - GENERAL OBSERVATIONS, REHAB EVAL
13:15-13:45. chart reviewed. Pt received semi-hunter at B/S, alert, oriented, able to follow multi-step instructions and agreeable to PT evaluation.  son at B/S, + IV, + O2 2 L via NC, + monitoring, + A Line, c/o pain R neck 4/10. stable vitals, NAD. 13:15-13:45. chart reviewed. Pt received semi-hunter at B/S, alert, oriented, able to follow multi-step instructions and agreeable to PT evaluation. + IV, + monitoring, c/o pain R neck 4/10. stable vitals, NAD.

## 2020-08-25 NOTE — PHYSICAL THERAPY INITIAL EVALUATION ADULT - PERTINENT HX OF CURRENT PROBLEM, REHAB EVAL
63 yo female presents s/p smoking cessation, "in 2/2020 my bp went [place]& i came to the  hospital&  i saw vascular& cardiologist, the ultrasound showed that the right side of my neck is blocked" pt is scheduled for rcea 65 yo female presents s/p smoking cessation, in 2/2020 my bp went [place] high & i came to the  hospital &  i saw vascular& cardiologist, the ultrasound showed that the right side of my neck is blocked" pt is scheduled for rcea

## 2020-08-25 NOTE — PHYSICAL THERAPY INITIAL EVALUATION ADULT - TINETTI GAIT TEST, REHAB EVAL
Based on Tinetti score, Pt at high risk of falling Based on Tinetti score, Pt at moderate risk of falling

## 2020-08-25 NOTE — DISCHARGE NOTE PROVIDER - NSDCMRMEDTOKEN_GEN_ALL_CORE_FT
amLODIPine 10 mg oral tablet: 0.5 tab(s) orally once a day (at bedtime)  aspirin 81 mg oral tablet: orally 2 times a day  hydroCHLOROthiazide 25 mg oral tablet: 1 tab(s) orally once a day  losartan 100 mg oral tablet: 1 tab(s) orally once a day  oxyCODONE 5 mg oral capsule: 1 cap(s) orally once a day MDD:1  Probiotic Formula oral capsule: 1 cap(s) orally once a day  rosuvastatin 5 mg oral tablet: 1 tab(s) orally once a day  Vitamin B12 500 mcg oral tablet: 1 tab(s) orally once a day  Vitamin D3 5000 intl units (125 mcg) oral tablet: orally once a day amLODIPine 10 mg oral tablet: 0.5 tab(s) orally once a day (at bedtime)  aspirin 81 mg oral tablet: orally 2 times a day  hydroCHLOROthiazide 25 mg oral tablet: 1 tab(s) orally once a day  losartan 100 mg oral tablet: 1 tab(s) orally once a day  Percocet 5 mg-325 mg oral tablet: 1 tab(s) orally 4 times a day MDD:4  Probiotic Formula oral capsule: 1 cap(s) orally once a day  rosuvastatin 5 mg oral tablet: 1 tab(s) orally once a day  Vitamin B12 500 mcg oral tablet: 1 tab(s) orally once a day  Vitamin D3 5000 intl units (125 mcg) oral tablet: orally once a day

## 2020-08-25 NOTE — DISCHARGE NOTE PROVIDER - NSDCCPCAREPLAN_GEN_ALL_CORE_FT
PRINCIPAL DISCHARGE DIAGNOSIS  Diagnosis: Carotid artery stenosis  Assessment and Plan of Treatment: s/p carotid endarterectomy  diet: continue regular diet, low salt  medication: restart home medication. please take aspirin 81 and your risuvastatin 5. Take tylenol over the counter as needed for pain for the next 4 days. Take oxycodone 5mg as needed for breakthrough pain. Please be aware medication may cause drowsiness. Reserve for night time use.  activity: no heavy lifting (over 10 lbs) for 2 weeks. Or until told otherwise at follow up.  dressing: please keep dressing for 48 hours, you may shower with it as it is water proof. remove after 48 hours. Do not remove steri strips. they will fall on their own.  follow up: please follow up with Dr. Hall in 2 weeks PRINCIPAL DISCHARGE DIAGNOSIS  Diagnosis: Carotid artery stenosis  Assessment and Plan of Treatment: s/p carotid endarterectomy  diet: continue regular diet, low salt  medication: restart home medication. please take aspirin 81 and your risuvastatin 5. Take tylenol over the counter as needed for pain for the next 4 days. Take oxycodone 5mg as needed for breakthrough pain. Please be aware medication may cause drowsiness. Reserve for night time use.  activity: no heavy lifting (over 10 lbs) for 2 weeks. Or until told otherwise at follow up.  dressing: please keep dressing for 24 hours, you may shower with it as it is water proof. remove after 24 hours. You have staples under dressing. do not remove, they will be removed in office  follow up: please follow up with Dr. Hall in 2 weeks PRINCIPAL DISCHARGE DIAGNOSIS  Diagnosis: Carotid artery stenosis  Assessment and Plan of Treatment: s/p carotid endarterectomy  diet: continue regular diet, low salt  medication: restart home medication. please take aspirin 81 and your risuvastatin 5. Take tylenol over the counter as needed for pain for the next 4 days. Take percocet every 6 hrs for 3 days as needed for breakthrough pain. Please be aware medication may cause drowsiness. Reserve for night time use.  activity: no heavy lifting (over 10 lbs) for 2 weeks. Or until told otherwise at follow up.  dressing: please keep dressing for 24 hours, you may shower with it as it is water proof. remove after 24 hours. You have staples under dressing. do not remove, they will be removed in office  follow up: please follow up with Dr. Hall in 2 weeks

## 2020-08-25 NOTE — DISCHARGE NOTE NURSING/CASE MANAGEMENT/SOCIAL WORK - PATIENT PORTAL LINK FT
You can access the FollowMyHealth Patient Portal offered by [facility] by registering at the following website: [email or web address]. By joining [facility]’s FollowMyHealth portal, you will also be able to view your health information using other applications (apps) compatible with our system.

## 2020-08-25 NOTE — DISCHARGE NOTE PROVIDER - HOSPITAL COURSE
65y Female  PMHx of HLD, HTN, osteopenia, former smoker (quit Feb 2020), COVID 8/21 negative, presents yesterday for elective right CEA. Patient was found to have an incidental findings on US outpatient when she was seen for HTN that showed R ICA 80% stenosis.     Post op the pt had no neurological deficits, was hypertensive, received labetalol 10mg x1 and received her home dose of amlodipine.         Overnight: pain was controlled with tylenol/oxy PRN. Otherwise no acute events overnight. Tolerating CLD, Ambulating. Wound dressing is clean and dry and it mildly tender,        Patient was medically stable at time of admission. 65y Female  PMHx of HLD, HTN, osteopenia, former smoker (quit Feb 2020), COVID 8/21 negative, presents yesterday for elective right CEA. Patient was found to have an incidental findings on US outpatient when she was seen for HTN that showed R ICA 80% stenosis.     Post op the pt had no neurological deficits, was hypertensive, received labetalol 10mg x1 and received her home dose of amlodipine.         8/24-8/25: pain was controlled with tylenol/oxy PRN. Otherwise no acute events overnight. Tolerating CLD, Ambulating. Wound dressing is clean and dry and it mildly tender.        8/25-8/26: Patient in no acute distress. pain under control. On exam, patient found so have oozing from incision site with mild expansion of R neck hematoma, no hard signs of bleeding, likely venous oozing. Patient was injected with lidocaine & epi for source control.         Patient was medically stable at time of admission.

## 2020-08-25 NOTE — PROGRESS NOTE ADULT - SUBJECTIVE AND OBJECTIVE BOX
DARRIAN PAT  [id number]  65y Female    Indication for ICU admission: s/p R CEA   Admit Date: 8/24  ICU Date: 8/24  OR Date: 8/24    No Known Allergies    PAST MEDICAL & SURGICAL HISTORY:  Carotid stenosis  Pneumonia  H/O osteopenia  High cholesterol  HTN (hypertension)  No significant past surgical history    Home Medications:  amLODIPine 10 mg oral tablet: 0.5 tab(s) orally once a day (at bedtime) (24 Aug 2020 11:30)  aspirin 81 mg oral tablet: orally 2 times a day (24 Aug 2020 11:30)  hydroCHLOROthiazide 25 mg oral tablet: 1 tab(s) orally once a day (24 Aug 2020 11:30)  losartan 100 mg oral tablet: 1 tab(s) orally once a day (24 Aug 2020 11:30)  Probiotic Formula oral capsule: 1 cap(s) orally once a day (24 Aug 2020 11:30)  rosuvastatin 5 mg oral tablet: 1 tab(s) orally once a day (24 Aug 2020 11:30)  Vitamin B12 500 mcg oral tablet: 1 tab(s) orally once a day (24 Aug 2020 11:30)  Vitamin D3 5000 intl units (125 mcg) oral tablet: orally once a day (24 Aug 2020 11:30)        24HRS EVENT:  NEURO:    Acute pain-controlled with  tylenol oxy prn   s/p right cea-neurochecks q1, no postoperative deficits  as per vascular    RESP:     Oxygen insufficiency-wean off NC to RA as tolerate    Activity-bedrest until 8/25 AM      CARDS:     s/p CEA-SBP     hx of HTN-restarted amlodipine, hctz, losartan, ASA to start 8/25    -post op hypertensive, received labetalol 10mg x2 and home amlodipine, was on cleviprex gtt for 1 hr then d/c'd    hx of HLD-restarted home statin    Imaging:  preop EKG NSR, post op EKG NSR     Labs: CE neg x2, f/u am       GI/NUTR:     Diet-started on clears 4-5hrs post operative     GI Prophylaxis-PPI    Bowel regimen-senna 2 Tablet(s) Oral daily     /RENAL:     Strict I/O-raymundo in place, will d/w vascular 8/25 regarding removal    BUN/Cr-19/.8    HEME/ONC:     DVT prophylaxis-holding as per vascular fellow Dr. Fishman, to restart 8/25 AM    Antiplatelet therapy-patient took ASA this AM, will continue 8/25 AM    Intraop 9000u heparin, monitor post operative H/H    ID:    antibiotics-2more doses of cefotetan pending       ENDO:    Glucose-start ISS if FSG >160             DVT PTX:     GI PTX:    ***Tubes/Lines/Drains  ***  peripherals  Arterial Line	r rad	                Date 8/24  Urinary Catheter		Indication: Strict I&O    Date Placed:       REVIEW OF SYSTEMS    [x ] A ten-point review of systems was otherwise negative except as noted.  [ ] Due to altered mental status/intubation, subjective information were not able to be obtained from the patient. History was obtained, to the extent possible, from review of the chart and collateral sources of information. DARRIAN PAT  [id number]  65y Female    Indication for ICU admission: s/p R CEA   Admit Date:   ICU Date:   OR Date:     No Known Allergies    PAST MEDICAL & SURGICAL HISTORY:  Carotid stenosis  Pneumonia  H/O osteopenia  High cholesterol  HTN (hypertension)  No significant past surgical history    Home Medications:  amLODIPine 10 mg oral tablet: 0.5 tab(s) orally once a day (at bedtime) (24 Aug 2020 11:30)  aspirin 81 mg oral tablet: orally 2 times a day (24 Aug 2020 11:30)  hydroCHLOROthiazide 25 mg oral tablet: 1 tab(s) orally once a day (24 Aug 2020 11:30)  losartan 100 mg oral tablet: 1 tab(s) orally once a day (24 Aug 2020 11:30)  Probiotic Formula oral capsule: 1 cap(s) orally once a day (24 Aug 2020 11:30)  rosuvastatin 5 mg oral tablet: 1 tab(s) orally once a day (24 Aug 2020 11:30)  Vitamin B12 500 mcg oral tablet: 1 tab(s) orally once a day (24 Aug 2020 11:30)  Vitamin D3 5000 intl units (125 mcg) oral tablet: orally once a day (24 Aug 2020 11:30)        24HRS EVENT:  NEURO:    Acute pain-controlled with  tylenol oxy prn   s/p right cea-neurochecks q1, no postoperative deficits  as per vascular    RESP:     Oxygen insufficiency-wean off NC to RA as tolerate    Activity-bedrest until 825 AM      CARDS:     s/p CEA-SBP     hx of HTN-restarted amlodipine, hctz, losartan, ASA to start     -post op hypertensive, received labetalol 10mg x2 and home amlodipine, was on cleviprex gtt for 1 hr then d/c'd    hx of HLD-restarted home statin    Imaging:  preop EKG NSR, post op EKG NSR     Labs: CE neg x2, f/u am       GI/NUTR:     Diet-started on clears 4-5hrs post operative     GI Prophylaxis-PPI    Bowel regimen-senna 2 Tablet(s) Oral daily     /RENAL:     Strict I/O-raymundo in place, will d/w vascular  regarding removal    BUN/Cr-19/.8    HEME/ONC:     DVT prophylaxis-holding as per vascular fellow Dr. Fishman, to restart 8 AM    Antiplatelet therapy-patient took ASA this AM, will continue  AM    Intraop 9000u heparin, monitor post operative H/H    ID:    antibiotics-2more doses of cefotetan pending       ENDO:    Glucose-start ISS if FSG >160             DVT PTX:     GI PTX:    ***Tubes/Lines/Drains  ***  peripherals  Arterial Line	r rad	                Date   Urinary Catheter		Indication: Strict I&O    Date Placed:       REVIEW OF SYSTEMS    [x ] A ten-point review of systems was otherwise negative except as noted.  [ ] Due to altered mental status/intubation, subjective information were not able to be obtained from the patient. History was obtained, to the extent possible, from review of the chart and collateral sources of information.    Daily Height in cm: 157.48 (24 Aug 2020 23:10)    Daily Weight in k (25 Aug 2020 06:00)    Diet, DASH/TLC:   Sodium & Cholesterol Restricted (20 @ 06:35)      CURRENT MEDS:  Neurologic Medications  acetaminophen   Tablet .. 650 milliGRAM(s) Oral every 6 hours  oxyCODONE    IR 5 milliGRAM(s) Oral every 6 hours PRN Moderate Pain (4 - 6)  oxyCODONE    IR 10 milliGRAM(s) Oral every 6 hours PRN Severe Pain (7 - 10)    Respiratory Medications    Cardiovascular Medications  amLODIPine   Tablet 5 milliGRAM(s) Oral at bedtime  hydrochlorothiazide 25 milliGRAM(s) Oral daily  losartan 100 milliGRAM(s) Oral daily    Gastrointestinal Medications  cyanocobalamin 500 MICROGram(s) Oral daily  pantoprazole    Tablet 40 milliGRAM(s) Oral before breakfast  senna 2 Tablet(s) Oral at bedtime    Genitourinary Medications    Hematologic/Oncologic Medications  aspirin  chewable 81 milliGRAM(s) Oral daily    Antimicrobial/Immunologic Medications    Endocrine/Metabolic Medications  atorvastatin 20 milliGRAM(s) Oral at bedtime    Topical/Other Medications      ICU Vital Signs Last 24 Hrs  T(C): 36.7 (25 Aug 2020 08:00), Max: 37.1 (25 Aug 2020 04:00)  T(F): 98 (25 Aug 2020 08:00), Max: 98.7 (25 Aug 2020 04:00)  HR: 67 (25 Aug 2020 11:00) (65 - 78)  BP: 126/60 (24 Aug 2020 22:00) (103/53 - 127/60)  BP(mean): 89 (24 Aug 2020 20:30) (72 - 89)  ABP: 120/49 (25 Aug 2020 11:00) (109/41 - 167/56)  ABP(mean): 73 (25 Aug 2020 11:00) (63 - 94)  RR: 15 (25 Aug 2020 11:00) (12 - 20)  SpO2: 97% (25 Aug 2020 11:00) (86% - 100%)      Adult Advanced Hemodynamics Last 24 Hrs  CVP(mm Hg): --  CVP(cm H2O): --  CO: --  CI: --  PA: --  PA(mean): --  PCWP: --  SVR: --  SVRI: --  PVR: --  PVRI: --          I&O's Summary    24 Aug 2020 07:01  -  25 Aug 2020 07:00  --------------------------------------------------------  IN: 1320 mL / OUT: 1720 mL / NET: -400 mL    25 Aug 2020 07:01  -  25 Aug 2020 12:14  --------------------------------------------------------  IN: 440 mL / OUT: 400 mL / NET: 40 mL      I&O's Detail    24 Aug 2020 07:01  -  25 Aug 2020 07:00  --------------------------------------------------------  IN:    clevidipine Infusion: 20 mL    IV PiggyBack: 100 mL    lactated ringers.: 1200 mL  Total IN: 1320 mL    OUT:    Indwelling Catheter - Urethral: 1720 mL  Total OUT: 1720 mL    Total NET: -400 mL      25 Aug 2020 07:01  -  25 Aug 2020 12:14  --------------------------------------------------------  IN:    Oral Fluid: 440 mL  Total IN: 440 mL    OUT:    Voided: 400 mL  Total OUT: 400 mL    Total NET: 40 mL          PHYSICAL EXAM:    Exam: A&Ox3, no focal deficits  R neck dressing, swollen, no erythema or hematoma palpated     RESPIRATORY:  Normal expansion/effort      CARDIOVASCULAR:   S1/S2.  RRR  No peripheral edema    GASTROINTESTINAL:  Abdomen soft, non-tender, non-distended    MUSCULOSKELETAL:  Extremities warm, pink, well-perfused. palpable dp b/l    DERM:  No skin breakdown     :   No raymundo    CXR:       LABS:  CAPILLARY BLOOD GLUCOSE                              10.3   6.51  )-----------( 150      ( 25 Aug 2020 04:45 )             30.1       08-25    139  |  104  |  17  ----------------------------<  172<H>  4.0   |  23  |  0.7    Ca    8.3<L>      25 Aug 2020 04:45  Phos  3.1     08-25  Mg     2.3     08-25          CARDIAC MARKERS ( 25 Aug 2020 04:45 )  x     / <0.01 ng/mL / 148 U/L / x     / 2.1 ng/mL  CARDIAC MARKERS ( 25 Aug 2020 00:50 )  x     / <0.01 ng/mL / 133 U/L / x     / 2.1 ng/mL  CARDIAC MARKERS ( 24 Aug 2020 19:10 )  x     / <0.01 ng/mL / 81 U/L / x     / 2.0 ng/mL

## 2020-08-25 NOTE — DISCHARGE NOTE PROVIDER - CARE PROVIDER_API CALL
Chance Hall  VASCULAR SURGERY  [address] [place] [address]  Phone: [phone]  Fax: [phone]  Follow Up Time:

## 2020-08-26 VITALS — SYSTOLIC BLOOD PRESSURE: 108 MMHG | OXYGEN SATURATION: 99 % | HEART RATE: 60 BPM | DIASTOLIC BLOOD PRESSURE: 55 MMHG

## 2020-08-26 LAB
ANION GAP SERPL CALC-SCNC: 7 MMOL/L — SIGNIFICANT CHANGE UP (ref 7–14)
BUN SERPL-MCNC: 17 MG/DL — SIGNIFICANT CHANGE UP (ref 10–20)
CALCIUM SERPL-MCNC: 8.5 MG/DL — SIGNIFICANT CHANGE UP (ref 8.5–10.1)
CHLORIDE SERPL-SCNC: 105 MMOL/L — SIGNIFICANT CHANGE UP (ref 98–110)
CO2 SERPL-SCNC: 28 MMOL/L — SIGNIFICANT CHANGE UP (ref 17–32)
CREAT SERPL-MCNC: 0.8 MG/DL — SIGNIFICANT CHANGE UP (ref 0.7–1.5)
GLUCOSE SERPL-MCNC: 132 MG/DL — HIGH (ref 70–99)
HCT VFR BLD CALC: 29.7 % — LOW (ref 37–47)
HGB BLD-MCNC: 9.8 G/DL — LOW (ref 12–16)
MAGNESIUM SERPL-MCNC: 2.1 MG/DL — SIGNIFICANT CHANGE UP (ref 1.8–2.4)
MCHC RBC-ENTMCNC: 30.2 PG — SIGNIFICANT CHANGE UP (ref 27–31)
MCHC RBC-ENTMCNC: 33 G/DL — SIGNIFICANT CHANGE UP (ref 32–37)
MCV RBC AUTO: 91.4 FL — SIGNIFICANT CHANGE UP (ref 81–99)
NRBC # BLD: 0 /100 WBCS — SIGNIFICANT CHANGE UP (ref 0–0)
PHOSPHATE SERPL-MCNC: 2.9 MG/DL — SIGNIFICANT CHANGE UP (ref 2.1–4.9)
PLATELET # BLD AUTO: 141 K/UL — SIGNIFICANT CHANGE UP (ref 130–400)
POTASSIUM SERPL-MCNC: 3.9 MMOL/L — SIGNIFICANT CHANGE UP (ref 3.5–5)
POTASSIUM SERPL-SCNC: 3.9 MMOL/L — SIGNIFICANT CHANGE UP (ref 3.5–5)
RBC # BLD: 3.25 M/UL — LOW (ref 4.2–5.4)
RBC # FLD: 13.5 % — SIGNIFICANT CHANGE UP (ref 11.5–14.5)
SODIUM SERPL-SCNC: 140 MMOL/L — SIGNIFICANT CHANGE UP (ref 135–146)
WBC # BLD: 7.08 K/UL — SIGNIFICANT CHANGE UP (ref 4.8–10.8)
WBC # FLD AUTO: 7.08 K/UL — SIGNIFICANT CHANGE UP (ref 4.8–10.8)

## 2020-08-26 PROCEDURE — 71045 X-RAY EXAM CHEST 1 VIEW: CPT | Mod: 26

## 2020-08-26 PROCEDURE — 99232 SBSQ HOSP IP/OBS MODERATE 35: CPT

## 2020-08-26 RX ORDER — POTASSIUM PHOSPHATE, MONOBASIC POTASSIUM PHOSPHATE, DIBASIC 236; 224 MG/ML; MG/ML
15 INJECTION, SOLUTION INTRAVENOUS ONCE
Refills: 0 | Status: COMPLETED | OUTPATIENT
Start: 2020-08-26 | End: 2020-08-26

## 2020-08-26 RX ORDER — POTASSIUM CHLORIDE 20 MEQ
10 PACKET (EA) ORAL ONCE
Refills: 0 | Status: DISCONTINUED | OUTPATIENT
Start: 2020-08-26 | End: 2020-08-26

## 2020-08-26 RX ADMIN — Medication 650 MILLIGRAM(S): at 07:00

## 2020-08-26 RX ADMIN — PANTOPRAZOLE SODIUM 40 MILLIGRAM(S): 20 TABLET, DELAYED RELEASE ORAL at 06:13

## 2020-08-26 RX ADMIN — PREGABALIN 500 MICROGRAM(S): 225 CAPSULE ORAL at 11:50

## 2020-08-26 RX ADMIN — POTASSIUM PHOSPHATE, MONOBASIC POTASSIUM PHOSPHATE, DIBASIC 63.75 MILLIMOLE(S): 236; 224 INJECTION, SOLUTION INTRAVENOUS at 02:12

## 2020-08-26 RX ADMIN — Medication 81 MILLIGRAM(S): at 11:51

## 2020-08-26 RX ADMIN — Medication 25 MILLIGRAM(S): at 06:13

## 2020-08-26 RX ADMIN — LOSARTAN POTASSIUM 100 MILLIGRAM(S): 100 TABLET, FILM COATED ORAL at 06:13

## 2020-08-26 RX ADMIN — Medication 650 MILLIGRAM(S): at 06:13

## 2020-08-26 RX ADMIN — Medication 650 MILLIGRAM(S): at 02:20

## 2020-08-26 NOTE — PROGRESS NOTE ADULT - ATTENDING COMMENTS
Small prox hematoma with some skin edge bleeding post coughing.  I injected Lido w/t epi in the subq in the affected area. Neuro intact.  Will observe over nite.
stable overnight   BP well controlled   tolerating diet   neuro intact   small , stable neck hematoma   IVL   OOB and ambulate   d/c home on baby ASA   d/w Dr Hall
minimal oozing from incision s/p injection of epi  stable overnight   d/c home   f/u as outpatient

## 2020-08-26 NOTE — PROGRESS NOTE ADULT - SUBJECTIVE AND OBJECTIVE BOX
DARRIAN PAT  [id number]  65y Female    Indication for ICU admission: s/p R CEA   Admit Date: 8/24  ICU Date: 8/24  OR Date: 8/24    No Known Allergies    PAST MEDICAL & SURGICAL HISTORY:  Carotid stenosis  Pneumonia  H/O osteopenia  High cholesterol  HTN (hypertension)  No significant past surgical history    Home Medications:  amLODIPine 10 mg oral tablet: 0.5 tab(s) orally once a day (at bedtime) (24 Aug 2020 11:30)  aspirin 81 mg oral tablet: orally 2 times a day (24 Aug 2020 11:30)  hydroCHLOROthiazide 25 mg oral tablet: 1 tab(s) orally once a day (24 Aug 2020 11:30)  losartan 100 mg oral tablet: 1 tab(s) orally once a day (24 Aug 2020 11:30)  Probiotic Formula oral capsule: 1 cap(s) orally once a day (24 Aug 2020 11:30)  rosuvastatin 5 mg oral tablet: 1 tab(s) orally once a day (24 Aug 2020 11:30)  Vitamin B12 500 mcg oral tablet: 1 tab(s) orally once a day (24 Aug 2020 11:30)  Vitamin D3 5000 intl units (125 mcg) oral tablet: orally once a day (24 Aug 2020 11:30)        24HRS EVENT:  Overnight: Dr Hall injected lidocaine in R neck due to some oozing    NEURO:  Acute pain-controlled with  tylenol oxy prn  s/p right cea-neurochecks q4, no postoperative deficits     RESP:    Oxygen insufficiency - wean NC to RA    Activity - ambulating normally       CARDS:   s/p CEA-SBP   hx of HTN-restarted amlodipine, hctz, losartan, ASA   post op hypertensive, received labetalol 10mg x2 and home amlodipine, was on cleviprex gtt for 1 hr then d/c'd  hx of HLD-restarted home statin  Imaging:  preop EKG NSR, post op EKG NSR   Labs: CE neg x3, f/u am       GI/NUTR:   Diet: DASH  GI Prophylaxis-PPI  Bowel regimen-senna 2 Tablet(s) Oral daily     /RENAL:   Strict I/O - raymundo d/c'd at 7am 8/25. Pt voiding.   BUN/Cr - 17/0.7 < 19/.8    HEME/ONC:   DVT prophylaxis - not required as likely discharging pt today  ASA restarted  Intraop 9000u heparin, monitor post operative H/H    ID:  Antibiotics d/c'd 8/25       ENDO:  Glucose-start ISS if FSG >160       ***Tubes/Lines/Drains  ***  peripherals        REVIEW OF SYSTEMS    [x ] A ten-point review of systems was otherwise negative except as noted.  [ ] Due to altered mental status/intubation, subjective information were not able to be obtained from the patient. History was obtained, to the extent possible, from review of the chart and collateral sources of information. DARRIAN PAT  [id number]  65y Female    Indication for ICU admission: s/p R CEA   Admit Date:   ICU Date:   OR Date:     No Known Allergies    PAST MEDICAL & SURGICAL HISTORY:  Carotid stenosis  Pneumonia  H/O osteopenia  High cholesterol  HTN (hypertension)  No significant past surgical history    Home Medications:  amLODIPine 10 mg oral tablet: 0.5 tab(s) orally once a day (at bedtime) (24 Aug 2020 11:30)  aspirin 81 mg oral tablet: orally 2 times a day (24 Aug 2020 11:30)  hydroCHLOROthiazide 25 mg oral tablet: 1 tab(s) orally once a day (24 Aug 2020 11:30)  losartan 100 mg oral tablet: 1 tab(s) orally once a day (24 Aug 2020 11:30)  Probiotic Formula oral capsule: 1 cap(s) orally once a day (24 Aug 2020 11:30)  rosuvastatin 5 mg oral tablet: 1 tab(s) orally once a day (24 Aug 2020 11:30)  Vitamin B12 500 mcg oral tablet: 1 tab(s) orally once a day (24 Aug 2020 11:30)  Vitamin D3 5000 intl units (125 mcg) oral tablet: orally once a day (24 Aug 2020 11:30)        24HRS EVENT:  Overnight: Dr Hall injected lidocaine in R neck due to some oozing, small hematoma at superior portion of incision    NEURO:  Acute pain-controlled with  tylenol oxy prn  s/p right cea-neurochecks q4, no postoperative deficits     RESP:    Oxygen insufficiency - wean NC to RA    Activity - ambulating normally       CARDS:   s/p CEA-SBP   hx of HTN-restarted amlodipine, hctz, losartan, ASA   post op hypertensive, received labetalol 10mg x2 and home amlodipine, was on cleviprex gtt for 1 hr then d/c'd  hx of HLD-restarted home statin  Imaging:  preop EKG NSR, post op EKG NSR   Labs: CE neg x3, f/u am     GI/NUTR:   Diet: DASH  GI Prophylaxis-PPI  Bowel regimen-senna 2 Tablet(s) Oral daily     /RENAL:   Strict I/O - raymundo d/c'd at 7am . Pt voiding.   BUN/Cr - 17/0.7 < 19/.8  -    140  |  105  |  17  ----------------------------<  132<H>  3.9   |  28  |  0.8    Ca    8.5      26 Aug 2020 00:30  Phos  2.9       Mg     2.1             HEME/ONC:   DVT prophylaxis - not required as likely discharging pt today  ASA restarted  Intraop 9000u heparin, monitor post operative H/H                          9.8    7.08  )-----------( 141      ( 26 Aug 2020 00:30 )             29.7       ID:  Antibiotics d/c'd        ENDO:  Glucose-start ISS if FSG >160       ***Tubes/Lines/Drains  ***  peripherals        REVIEW OF SYSTEMS    [x ] A ten-point review of systems was otherwise negative except as noted.  [ ] Due to altered mental status/intubation, subjective information were not able to be obtained from the patient. History was obtained, to the extent possible, from review of the chart and collateral sources of information.    Daily     Daily Weight in k.5 (26 Aug 2020 05:00)    Diet, DASH/TLC:   Sodium & Cholesterol Restricted (20 @ 06:35)      CURRENT MEDS:  Neurologic Medications  acetaminophen   Tablet .. 650 milliGRAM(s) Oral every 6 hours  oxyCODONE    IR 5 milliGRAM(s) Oral every 6 hours PRN Moderate Pain (4 - 6)    Respiratory Medications    Cardiovascular Medications  amLODIPine   Tablet 5 milliGRAM(s) Oral at bedtime  hydrochlorothiazide 25 milliGRAM(s) Oral daily  losartan 100 milliGRAM(s) Oral daily    Gastrointestinal Medications  cyanocobalamin 500 MICROGram(s) Oral daily  pantoprazole    Tablet 40 milliGRAM(s) Oral before breakfast  senna 2 Tablet(s) Oral at bedtime    Genitourinary Medications    Hematologic/Oncologic Medications  aspirin  chewable 81 milliGRAM(s) Oral daily    Antimicrobial/Immunologic Medications    Endocrine/Metabolic Medications  atorvastatin 20 milliGRAM(s) Oral at bedtime    Topical/Other Medications      ICU Vital Signs Last 24 Hrs  T(C): 36.9 (26 Aug 2020 06:00), Max: 37.1 (25 Aug 2020 20:00)  T(F): 98.4 (26 Aug 2020 06:00), Max: 98.8 (25 Aug 2020 20:00)  HR: 58 (26 Aug 2020 06:00) (58 - 73)  BP: 101/49 (26 Aug 2020 06:00) (88/45 - 178/77)  BP(mean): 71 (26 Aug 2020 06:00) (63 - 110)  ABP: 128/59 (25 Aug 2020 13:00) (120/49 - 148/63)  ABP(mean): 85 (25 Aug 2020 13:00) (73 - 94)  RR: 18 (25 Aug 2020 23:00) (15 - 20)  SpO2: 99% (26 Aug 2020 06:00) (86% - 100%)      Adult Advanced Hemodynamics Last 24 Hrs  CVP(mm Hg): --  CVP(cm H2O): --  CO: --  CI: --  PA: --  PA(mean): --  PCWP: --  SVR: --  SVRI: --  PVR: --  PVRI: --          I&O's Summary    25 Aug 2020 07:  -  26 Aug 2020 07:00  --------------------------------------------------------  IN: 690 mL / OUT: 1680 mL / NET: -990 mL      I&O's Detail    25 Aug 2020 07:  -  26 Aug 2020 07:00  --------------------------------------------------------  IN:    IV PiggyBack: 250 mL    Oral Fluid: 440 mL  Total IN: 690 mL    OUT:    Voided: 1680 mL  Total OUT: 1680 mL    Total NET: -990 mL          PHYSICAL EXAM:    General/Neuro  RASS:             GCS:     = E   / V   / M      Deficits:                             alert & oriented x 3, no focal deficits  Pupils:    Lungs:      clear to auscultation, Normal expansion/effort.     Cardiovascular : S1, S2.  Regular rate and rhythm.  Peripheral edema   Cardiac Rhythm: Normal Sinus Rhythm    GI: Abdomen soft, Non-tender, Non-distended.    Gastrostomy / Jejunostomy tube in place.  Nasogastric tube in place.  Colostomy / Ileostomy.    Wound:    Extremities: Extremities warm, pink, well-perfused. Pulses:Rt     Lt    Derm: Good skin turgor, no skin breakdown.      :       Raymundo catheter in place.      CXR:     LABS:  CAPILLARY BLOOD GLUCOSE                              9.8    7.08  )-----------( 141      ( 26 Aug 2020 00:30 )             29.7       08-26    140  |  105  |  17  ----------------------------<  132<H>  3.9   |  28  |  0.8    Ca    8.5      26 Aug 2020 00:30  Phos  2.9     08-26  Mg     2.1     08-26          CARDIAC MARKERS ( 25 Aug 2020 04:45 )  x     / <0.01 ng/mL / 148 U/L / x     / 2.1 ng/mL  CARDIAC MARKERS ( 25 Aug 2020 00:50 )  x     / <0.01 ng/mL / 133 U/L / x     / 2.1 ng/mL  CARDIAC MARKERS ( 24 Aug 2020 19:10 )  x     / <0.01 ng/mL / 81 U/L / x     / 2.0 ng/mL DARRIAN PAT  [id number]  65y Female    Indication for ICU admission: s/p R CEA   Admit Date:   ICU Date:   OR Date:     No Known Allergies    PAST MEDICAL & SURGICAL HISTORY:  Carotid stenosis  Pneumonia  H/O osteopenia  High cholesterol  HTN (hypertension)  No significant past surgical history    Home Medications:  amLODIPine 10 mg oral tablet: 0.5 tab(s) orally once a day (at bedtime) (24 Aug 2020 11:30)  aspirin 81 mg oral tablet: orally 2 times a day (24 Aug 2020 11:30)  hydroCHLOROthiazide 25 mg oral tablet: 1 tab(s) orally once a day (24 Aug 2020 11:30)  losartan 100 mg oral tablet: 1 tab(s) orally once a day (24 Aug 2020 11:30)  Probiotic Formula oral capsule: 1 cap(s) orally once a day (24 Aug 2020 11:30)  rosuvastatin 5 mg oral tablet: 1 tab(s) orally once a day (24 Aug 2020 11:30)  Vitamin B12 500 mcg oral tablet: 1 tab(s) orally once a day (24 Aug 2020 11:30)  Vitamin D3 5000 intl units (125 mcg) oral tablet: orally once a day (24 Aug 2020 11:30)        24HRS EVENT:  Overnight: Dr Hall injected lidocaine in R neck due to some oozing, small hematoma at superior portion of incision    NEURO:  Acute pain-controlled with  tylenol oxy prn  s/p right cea-neurochecks q4, no postoperative deficits     RESP:    Oxygen insufficiency - wean NC to RA    Activity - ambulating normally       CARDS:   s/p CEA-SBP   hx of HTN-restarted amlodipine, hctz, losartan, ASA   post op hypertensive, received labetalol 10mg x2 and home amlodipine, was on cleviprex gtt for 1 hr then d/c'd  hx of HLD-restarted home statin  Imaging:  preop EKG NSR, post op EKG NSR   Labs: CE neg x3, f/u am     GI/NUTR:   Diet: DASH  GI Prophylaxis-PPI  Bowel regimen-senna 2 Tablet(s) Oral daily     /RENAL:   Strict I/O - raymundo d/c'd at 7am . Pt voiding.   BUN/Cr - 17/0.7 < 19/.8  -    140  |  105  |  17  ----------------------------<  132<H>  3.9   |  28  |  0.8    Ca    8.5      26 Aug 2020 00:30  Phos  2.9       Mg     2.1             HEME/ONC:   DVT prophylaxis - not required as likely discharging pt today  ASA restarted  Intraop 9000u heparin, monitor post operative H/H                          9.8    7.08  )-----------( 141      ( 26 Aug 2020 00:30 )             29.7       ID:  Antibiotics d/c'd        ENDO:  Glucose-start ISS if FSG >160       ***Tubes/Lines/Drains  ***  peripherals        REVIEW OF SYSTEMS    [x ] A ten-point review of systems was otherwise negative except as noted.  [ ] Due to altered mental status/intubation, subjective information were not able to be obtained from the patient. History was obtained, to the extent possible, from review of the chart and collateral sources of information.    Daily     Daily Weight in k.5 (26 Aug 2020 05:00)    Diet, DASH/TLC:   Sodium & Cholesterol Restricted (20 @ 06:35)      CURRENT MEDS:  Neurologic Medications  acetaminophen   Tablet .. 650 milliGRAM(s) Oral every 6 hours  oxyCODONE    IR 5 milliGRAM(s) Oral every 6 hours PRN Moderate Pain (4 - 6)    Respiratory Medications    Cardiovascular Medications  amLODIPine   Tablet 5 milliGRAM(s) Oral at bedtime  hydrochlorothiazide 25 milliGRAM(s) Oral daily  losartan 100 milliGRAM(s) Oral daily    Gastrointestinal Medications  cyanocobalamin 500 MICROGram(s) Oral daily  pantoprazole    Tablet 40 milliGRAM(s) Oral before breakfast  senna 2 Tablet(s) Oral at bedtime    Genitourinary Medications    Hematologic/Oncologic Medications  aspirin  chewable 81 milliGRAM(s) Oral daily    Antimicrobial/Immunologic Medications    Endocrine/Metabolic Medications  atorvastatin 20 milliGRAM(s) Oral at bedtime    Topical/Other Medications      ICU Vital Signs Last 24 Hrs  T(C): 36.9 (26 Aug 2020 06:00), Max: 37.1 (25 Aug 2020 20:00)  T(F): 98.4 (26 Aug 2020 06:00), Max: 98.8 (25 Aug 2020 20:00)  HR: 58 (26 Aug 2020 06:00) (58 - 73)  BP: 101/49 (26 Aug 2020 06:00) (88/45 - 178/77)  BP(mean): 71 (26 Aug 2020 06:00) (63 - 110)  ABP: 128/59 (25 Aug 2020 13:00) (120/49 - 148/63)  ABP(mean): 85 (25 Aug 2020 13:00) (73 - 94)  RR: 18 (25 Aug 2020 23:00) (15 - 20)  SpO2: 99% (26 Aug 2020 06:00) (86% - 100%)      Adult Advanced Hemodynamics Last 24 Hrs  CVP(mm Hg): --  CVP(cm H2O): --  CO: --  CI: --  PA: --  PA(mean): --  PCWP: --  SVR: --  SVRI: --  PVR: --  PVRI: --          I&O's Summary    25 Aug 2020 07:  -  26 Aug 2020 07:00  --------------------------------------------------------  IN: 690 mL / OUT: 1680 mL / NET: -990 mL      I&O's Detail    25 Aug 2020 07:  -  26 Aug 2020 07:00  --------------------------------------------------------  IN:    IV PiggyBack: 250 mL    Oral Fluid: 440 mL  Total IN: 690 mL    OUT:    Voided: 1680 mL  Total OUT: 1680 mL    Total NET: -990 mL          PHYSICAL EXAM:    General/Neuro  Deficits:                             alert & oriented x 3, no focal deficits  Pupils: PERRL no deficits     Lungs:      clear to auscultation, Normal expansion/effort.     Cardiovascular : S1, S2.  Regular rate and rhythm.  Peripheral edema   Cardiac Rhythm: Normal Sinus Rhythm    GI: Abdomen soft, Non-tender, Non-distended.      Extremities: Extremities warm, pink, well-perfused. Pulses: intact bilaterally    Derm: Good skin turgor, no skin breakdown.      :       no Raymundo catheter in place.      CXR:     LABS:  CAPILLARY BLOOD GLUCOSE                              9.8    7.08  )-----------( 141      ( 26 Aug 2020 00:30 )             29.7       08-26    140  |  105  |  17  ----------------------------<  132<H>  3.9   |  28  |  0.8    Ca    8.5      26 Aug 2020 00:30  Phos  2.9     -  Mg     2.1     08-26          CARDIAC MARKERS ( 25 Aug 2020 04:45 )  x     / <0.01 ng/mL / 148 U/L / x     / 2.1 ng/mL  CARDIAC MARKERS ( 25 Aug 2020 00:50 )  x     / <0.01 ng/mL / 133 U/L / x     / 2.1 ng/mL  CARDIAC MARKERS ( 24 Aug 2020 19:10 )  x     / <0.01 ng/mL / 81 U/L / x     / 2.0 ng/mL

## 2020-08-26 NOTE — PROGRESS NOTE ADULT - ASSESSMENT
GENERAL SURGERY PROGRESS NOTE     DARRIAN STEWART  [address] [place] [address] day :2d  POD: 2  Procedure: Endarterectomy, carotid, right    Surgical Attending: Chance Hall  Overnight events: Patient found in NAD. On exam patient's wound was found to be oozing, treated with lidocaine & epinephrine.     T(F): 98 (08-26-20 @ 07:50), Max: 98.8 (08-25-20 @ 20:00)  HR: 58 (08-26-20 @ 08:00) (58 - 73)  BP: 113/52 (08-26-20 @ 08:00) (88/45 - 178/77)  ABP: 128/59 (08-25-20 @ 13:00) (120/49 - 147/66)  ABP(mean): 85 (08-25-20 @ 13:00) (73 - 85)  RR: 18 (08-25-20 @ 23:00) (15 - 20)  SpO2: 99% (08-26-20 @ 08:00) (94% - 100%)      08-25-20 @ 07:01  -  08-26-20 @ 07:00  --------------------------------------------------------  IN:    IV PiggyBack: 250 mL    Oral Fluid: 440 mL  Total IN: 690 mL    OUT:    Voided: 1680 mL  Total OUT: 1680 mL    Total NET: -990 mL        DIET/FLUIDS: cyanocobalamin 500 MICROGram(s) Oral daily    NG:                                                                                DRAINS:     BM:     EMESIS:     URINE:      GI proph:  pantoprazole    Tablet 40 milliGRAM(s) Oral before breakfast    AC/ proph: aspirin  chewable 81 milliGRAM(s) Oral daily    ABx:     GEN: NAD, well appearing  HEENT: NC, no tongue deviation. neck dressing clean and dry. oozing controlled. mild tenderness.   CHEST: Symmetric chest wall expansion. Regular heart rate  ABD: S/D, non-tender,    LABS  Labs:  CAPILLARY BLOOD GLUCOSE                              9.8    7.08  )-----------( 141      ( 26 Aug 2020 00:30 )             29.7         08-26    140  |  105  |  17  ----------------------------<  132<H>  3.9   |  28  |  0.8      Calcium, Total Serum: 8.5 mg/dL (08-26-20 @ 00:30)      LFTs:         Coags:    CARDIAC MARKERS ( 25 Aug 2020 04:45 )  x     / <0.01 ng/mL / 148 U/L / x     / 2.1 ng/mL  CARDIAC MARKERS ( 25 Aug 2020 00:50 )  x     / <0.01 ng/mL / 133 U/L / x     / 2.1 ng/mL  CARDIAC MARKERS ( 24 Aug 2020 19:10 )  x     / <0.01 ng/mL / 81 U/L / x     / 2.0 ng/mL                  RADIOLOGY & ADDITIONAL TESTS:      A/P:  DARRIAN STEWART is a 65yFemale POD#2 for Endarterectomy, carotid, right. Patient is stable, tolerating diet, ambulating and is ready to go home today      Plan:   - discharge today.   - follow up in 2 weeks.
Assessment & Plan    65y Female  s/p right carotid endarterectomy    NEURO:    Acute pain-controlled with  tylenol oxy prn   s/p right cea-neurochecks q1, no postoperative deficits  as per vascular    RESP:     Oxygen insufficiency-wean off NC to RA as tolerate    Activity-bedrest until 8/25 AM      CARDS:     s/p CEA-SBP     hx of HTN-restarted amlodipine, hctz, losartan,       -post op hypertensive, received labetalol 10mg x2 and home amlodipine, was on cleviprex gtt for 1 hr then d/c'd    hx of HLD-restarted home statin    Imaging: post op EKG pending, preop EKG NSR    Labs: Cardiac enzymes neg x2, f/u am      GI/NUTR:     started on clears 5 hours post op as per primary team     GI Prophylaxis-PPI    Bowel regimen-senna 2 Tablet(s) Oral daily     /RENAL:     Strict I/O-raymundo in place, will d/w vascular 8/25 regarding removal    BUN/Cr-19/0.8    HEME/ONC:     DVT prophylaxis-holding as per vascular fellow Dr. Fishman, to restart 8/25 AM    Antiplatelet therapy-patient took ASA this AM, will continue 8/25 AM    Intraop 9000u heparin, monitor post operative H/H    ID:    antibiotics-2more doses of cefotetan pending       ENDO:    Glucose-start ISS if FSG >160       LINES/DRAINS:  Lis DURÁN Foley     DISPO:    SICU will d/w Dr. Mcdowell
Assessment & Plan    65y Female  s/p right carotid endarterectomy    NEURO:    Acute pain-controlled with  tylenol oxy prn   s/p right cea-neurochecks q4, no postoperative deficits  as per vascular   8/25 late PM, Dr Hall injected lidocaine to R neck for oozing, pt tolerated the procedure well     RESP:     on RA    Activity-bedrest until 8/25 AM      CARDS:     hx of HTN-restarted amlodipine, hctz, losartan    hx of HLD-restarted home statin    Imaging: post op EKG pending, preop EKG NSR    Labs: Cardiac enzymes x3 neg      GI/NUTR:     Dash diet    PPI     Bowel regimen-senna 2 Tablet(s) Oral daily PRN    /RENAL:     voiding     BUN/Cr-17/0.8    HEME/ONC:     DVT prophylaxis-holding as per vascular fellow Dr. Fishman, to restart 8/25 AM    Antiplatelet therapy-patient took ASA this AM, will continue 8/25 AM    Intraop 9000u heparin, monitor post operative H/H    ID:    completed        ENDO:    Glucose-start ISS if FSG >160       LINES/DRAINS:  PIV,     DISPO:    d/c
ICU management  Neuro checks q1 hour  Strict blood pressure control  Neck checks  Pulse checks   Pain management

## 2020-08-27 LAB — SURGICAL PATHOLOGY STUDY: SIGNIFICANT CHANGE UP

## 2020-09-07 DIAGNOSIS — I10 ESSENTIAL (PRIMARY) HYPERTENSION: ICD-10-CM

## 2020-09-07 DIAGNOSIS — E78.5 HYPERLIPIDEMIA, UNSPECIFIED: ICD-10-CM

## 2020-09-07 DIAGNOSIS — I65.21 OCCLUSION AND STENOSIS OF RIGHT CAROTID ARTERY: ICD-10-CM

## 2020-09-07 DIAGNOSIS — Z87.891 PERSONAL HISTORY OF NICOTINE DEPENDENCE: ICD-10-CM

## 2020-09-07 DIAGNOSIS — L76.32 POSTPROCEDURAL HEMATOMA OF SKIN AND SUBCUTANEOUS TISSUE FOLLOWING OTHER PROCEDURE: ICD-10-CM

## 2020-09-07 DIAGNOSIS — Z79.82 LONG TERM (CURRENT) USE OF ASPIRIN: ICD-10-CM

## 2020-09-24 ENCOUNTER — OUTPATIENT (OUTPATIENT)
Dept: OUTPATIENT SERVICES | Facility: HOSPITAL | Age: 65
LOS: 1 days | Discharge: HOME | End: 2020-09-24

## 2020-09-25 DIAGNOSIS — M89.9 DISORDER OF BONE, UNSPECIFIED: ICD-10-CM

## 2020-09-25 DIAGNOSIS — Z78.0 ASYMPTOMATIC MENOPAUSAL STATE: ICD-10-CM

## 2020-09-25 DIAGNOSIS — Z13.820 ENCOUNTER FOR SCREENING FOR OSTEOPOROSIS: ICD-10-CM

## 2020-10-06 ENCOUNTER — OUTPATIENT (OUTPATIENT)
Dept: OUTPATIENT SERVICES | Facility: HOSPITAL | Age: 65
LOS: 1 days | Discharge: HOME | End: 2020-10-06
Payer: MEDICARE

## 2020-10-06 DIAGNOSIS — Z12.31 ENCOUNTER FOR SCREENING MAMMOGRAM FOR MALIGNANT NEOPLASM OF BREAST: ICD-10-CM

## 2020-10-06 PROCEDURE — 77067 SCR MAMMO BI INCL CAD: CPT | Mod: 26

## 2020-10-06 PROCEDURE — 77063 BREAST TOMOSYNTHESIS BI: CPT | Mod: 26

## 2020-10-13 ENCOUNTER — OUTPATIENT (OUTPATIENT)
Dept: OUTPATIENT SERVICES | Facility: HOSPITAL | Age: 65
LOS: 1 days | Discharge: HOME | End: 2020-10-13
Payer: MEDICARE

## 2020-10-13 DIAGNOSIS — R92.8 OTHER ABNORMAL AND INCONCLUSIVE FINDINGS ON DIAGNOSTIC IMAGING OF BREAST: ICD-10-CM

## 2020-10-13 PROCEDURE — G0279: CPT | Mod: 26

## 2020-10-13 PROCEDURE — 77065 DX MAMMO INCL CAD UNI: CPT | Mod: 26,RT

## 2020-10-13 PROCEDURE — 76642 ULTRASOUND BREAST LIMITED: CPT | Mod: 26,RT

## 2021-01-10 ENCOUNTER — INPATIENT (INPATIENT)
Facility: HOSPITAL | Age: 66
LOS: 0 days | Discharge: HOME | End: 2021-01-11
Attending: INTERNAL MEDICINE | Admitting: INTERNAL MEDICINE
Payer: MEDICARE

## 2021-01-10 VITALS
DIASTOLIC BLOOD PRESSURE: 124 MMHG | SYSTOLIC BLOOD PRESSURE: 232 MMHG | OXYGEN SATURATION: 98 % | RESPIRATION RATE: 20 BRPM | TEMPERATURE: 97 F | HEIGHT: 62 IN | HEART RATE: 76 BPM

## 2021-01-10 DIAGNOSIS — E55.9 VITAMIN D DEFICIENCY, UNSPECIFIED: ICD-10-CM

## 2021-01-10 DIAGNOSIS — U07.1 COVID-19: ICD-10-CM

## 2021-01-10 DIAGNOSIS — R94.31 ABNORMAL ELECTROCARDIOGRAM [ECG] [EKG]: ICD-10-CM

## 2021-01-10 DIAGNOSIS — R07.9 CHEST PAIN, UNSPECIFIED: ICD-10-CM

## 2021-01-10 DIAGNOSIS — E78.00 PURE HYPERCHOLESTEROLEMIA, UNSPECIFIED: ICD-10-CM

## 2021-01-10 DIAGNOSIS — E53.8 DEFICIENCY OF OTHER SPECIFIED B GROUP VITAMINS: ICD-10-CM

## 2021-01-10 DIAGNOSIS — I10 ESSENTIAL (PRIMARY) HYPERTENSION: ICD-10-CM

## 2021-01-10 LAB
ALBUMIN SERPL ELPH-MCNC: 4.1 G/DL — SIGNIFICANT CHANGE UP (ref 3.5–5.2)
ALP SERPL-CCNC: 87 U/L — SIGNIFICANT CHANGE UP (ref 30–115)
ALT FLD-CCNC: 88 U/L — HIGH (ref 0–41)
ANION GAP SERPL CALC-SCNC: 11 MMOL/L — SIGNIFICANT CHANGE UP (ref 7–14)
AST SERPL-CCNC: 43 U/L — HIGH (ref 0–41)
BASOPHILS # BLD AUTO: 0.02 K/UL — SIGNIFICANT CHANGE UP (ref 0–0.2)
BASOPHILS NFR BLD AUTO: 0.6 % — SIGNIFICANT CHANGE UP (ref 0–1)
BILIRUB SERPL-MCNC: 0.3 MG/DL — SIGNIFICANT CHANGE UP (ref 0.2–1.2)
BUN SERPL-MCNC: 23 MG/DL — HIGH (ref 10–20)
CALCIUM SERPL-MCNC: 9.8 MG/DL — SIGNIFICANT CHANGE UP (ref 8.5–10.1)
CHLORIDE SERPL-SCNC: 98 MMOL/L — SIGNIFICANT CHANGE UP (ref 98–110)
CO2 SERPL-SCNC: 28 MMOL/L — SIGNIFICANT CHANGE UP (ref 17–32)
CREAT SERPL-MCNC: 0.7 MG/DL — SIGNIFICANT CHANGE UP (ref 0.7–1.5)
EOSINOPHIL # BLD AUTO: 0.11 K/UL — SIGNIFICANT CHANGE UP (ref 0–0.7)
EOSINOPHIL NFR BLD AUTO: 3.4 % — SIGNIFICANT CHANGE UP (ref 0–8)
GLUCOSE SERPL-MCNC: 166 MG/DL — HIGH (ref 70–99)
HCT VFR BLD CALC: 37.6 % — SIGNIFICANT CHANGE UP (ref 37–47)
HGB BLD-MCNC: 12.8 G/DL — SIGNIFICANT CHANGE UP (ref 12–16)
IMM GRANULOCYTES NFR BLD AUTO: 0.3 % — SIGNIFICANT CHANGE UP (ref 0.1–0.3)
LYMPHOCYTES # BLD AUTO: 1.34 K/UL — SIGNIFICANT CHANGE UP (ref 1.2–3.4)
LYMPHOCYTES # BLD AUTO: 41.2 % — SIGNIFICANT CHANGE UP (ref 20.5–51.1)
MAGNESIUM SERPL-MCNC: 1.8 MG/DL — SIGNIFICANT CHANGE UP (ref 1.8–2.4)
MCHC RBC-ENTMCNC: 29.4 PG — SIGNIFICANT CHANGE UP (ref 27–31)
MCHC RBC-ENTMCNC: 34 G/DL — SIGNIFICANT CHANGE UP (ref 32–37)
MCV RBC AUTO: 86.4 FL — SIGNIFICANT CHANGE UP (ref 81–99)
MONOCYTES # BLD AUTO: 0.39 K/UL — SIGNIFICANT CHANGE UP (ref 0.1–0.6)
MONOCYTES NFR BLD AUTO: 12 % — HIGH (ref 1.7–9.3)
NEUTROPHILS # BLD AUTO: 1.38 K/UL — LOW (ref 1.4–6.5)
NEUTROPHILS NFR BLD AUTO: 42.5 % — SIGNIFICANT CHANGE UP (ref 42.2–75.2)
NRBC # BLD: 0 /100 WBCS — SIGNIFICANT CHANGE UP (ref 0–0)
PLATELET # BLD AUTO: 143 K/UL — SIGNIFICANT CHANGE UP (ref 130–400)
POTASSIUM SERPL-MCNC: 3.8 MMOL/L — SIGNIFICANT CHANGE UP (ref 3.5–5)
POTASSIUM SERPL-SCNC: 3.8 MMOL/L — SIGNIFICANT CHANGE UP (ref 3.5–5)
PROT SERPL-MCNC: 6.7 G/DL — SIGNIFICANT CHANGE UP (ref 6–8)
RAPID RVP RESULT: DETECTED
RBC # BLD: 4.35 M/UL — SIGNIFICANT CHANGE UP (ref 4.2–5.4)
RBC # FLD: 13.6 % — SIGNIFICANT CHANGE UP (ref 11.5–14.5)
SARS-COV-2 RNA SPEC QL NAA+PROBE: DETECTED
SODIUM SERPL-SCNC: 137 MMOL/L — SIGNIFICANT CHANGE UP (ref 135–146)
TROPONIN T SERPL-MCNC: <0.01 NG/ML — SIGNIFICANT CHANGE UP
WBC # BLD: 3.25 K/UL — LOW (ref 4.8–10.8)
WBC # FLD AUTO: 3.25 K/UL — LOW (ref 4.8–10.8)

## 2021-01-10 PROCEDURE — 71045 X-RAY EXAM CHEST 1 VIEW: CPT | Mod: 26

## 2021-01-10 PROCEDURE — 99223 1ST HOSP IP/OBS HIGH 75: CPT

## 2021-01-10 PROCEDURE — 99285 EMERGENCY DEPT VISIT HI MDM: CPT

## 2021-01-10 RX ORDER — LABETALOL HCL 100 MG
10 TABLET ORAL ONCE
Refills: 0 | Status: COMPLETED | OUTPATIENT
Start: 2021-01-10 | End: 2021-01-10

## 2021-01-10 RX ORDER — METOPROLOL TARTRATE 50 MG
50 TABLET ORAL
Refills: 0 | Status: DISCONTINUED | OUTPATIENT
Start: 2021-01-10 | End: 2021-01-11

## 2021-01-10 RX ORDER — L.ACIDOPH/B.ANIMALIS/B.LONGUM 15B CELL
1 CAPSULE ORAL
Qty: 0 | Refills: 0 | DISCHARGE

## 2021-01-10 RX ORDER — AMLODIPINE BESYLATE 2.5 MG/1
10 TABLET ORAL ONCE
Refills: 0 | Status: COMPLETED | OUTPATIENT
Start: 2021-01-10 | End: 2021-01-10

## 2021-01-10 RX ORDER — AMLODIPINE BESYLATE 2.5 MG/1
0.5 TABLET ORAL
Qty: 0 | Refills: 0 | DISCHARGE

## 2021-01-10 RX ORDER — CHOLECALCIFEROL (VITAMIN D3) 125 MCG
2000 CAPSULE ORAL DAILY
Refills: 0 | Status: DISCONTINUED | OUTPATIENT
Start: 2021-01-10 | End: 2021-01-11

## 2021-01-10 RX ORDER — PREGABALIN 225 MG/1
1000 CAPSULE ORAL DAILY
Refills: 0 | Status: DISCONTINUED | OUTPATIENT
Start: 2021-01-10 | End: 2021-01-11

## 2021-01-10 RX ORDER — ROSUVASTATIN CALCIUM 5 MG/1
1 TABLET ORAL
Qty: 0 | Refills: 0 | DISCHARGE

## 2021-01-10 RX ORDER — HYDROCHLOROTHIAZIDE 25 MG
25 TABLET ORAL DAILY
Refills: 0 | Status: DISCONTINUED | OUTPATIENT
Start: 2021-01-10 | End: 2021-01-11

## 2021-01-10 RX ORDER — ATORVASTATIN CALCIUM 80 MG/1
20 TABLET, FILM COATED ORAL AT BEDTIME
Refills: 0 | Status: DISCONTINUED | OUTPATIENT
Start: 2021-01-10 | End: 2021-01-11

## 2021-01-10 RX ORDER — ASPIRIN/CALCIUM CARB/MAGNESIUM 324 MG
325 TABLET ORAL ONCE
Refills: 0 | Status: COMPLETED | OUTPATIENT
Start: 2021-01-10 | End: 2021-01-10

## 2021-01-10 RX ORDER — ASPIRIN/CALCIUM CARB/MAGNESIUM 324 MG
81 TABLET ORAL DAILY
Refills: 0 | Status: DISCONTINUED | OUTPATIENT
Start: 2021-01-10 | End: 2021-01-11

## 2021-01-10 RX ORDER — LOSARTAN POTASSIUM 100 MG/1
100 TABLET, FILM COATED ORAL DAILY
Refills: 0 | Status: DISCONTINUED | OUTPATIENT
Start: 2021-01-10 | End: 2021-01-11

## 2021-01-10 RX ORDER — ENOXAPARIN SODIUM 100 MG/ML
40 INJECTION SUBCUTANEOUS DAILY
Refills: 0 | Status: DISCONTINUED | OUTPATIENT
Start: 2021-01-10 | End: 2021-01-11

## 2021-01-10 RX ADMIN — Medication 50 MILLIGRAM(S): at 17:05

## 2021-01-10 RX ADMIN — Medication 325 MILLIGRAM(S): at 05:40

## 2021-01-10 RX ADMIN — ENOXAPARIN SODIUM 40 MILLIGRAM(S): 100 INJECTION SUBCUTANEOUS at 14:38

## 2021-01-10 RX ADMIN — Medication 2000 UNIT(S): at 14:38

## 2021-01-10 RX ADMIN — PREGABALIN 1000 MICROGRAM(S): 225 CAPSULE ORAL at 14:38

## 2021-01-10 RX ADMIN — AMLODIPINE BESYLATE 10 MILLIGRAM(S): 2.5 TABLET ORAL at 03:57

## 2021-01-10 RX ADMIN — ATORVASTATIN CALCIUM 20 MILLIGRAM(S): 80 TABLET, FILM COATED ORAL at 22:28

## 2021-01-10 NOTE — H&P ADULT - HISTORY OF PRESENT ILLNESS
64yo female with known history of HTN and HLD presents to the ER due to a squeezing mid sternal chest discomfort. Says it is aggravated by stress. While in the ER her blood pressure was noted to be high. Patient reports similar presentations to the ER in the past. She is taking care of her sick  at home. She denies any respiratory symptoms or even any shortness of breath though in our ER she tested positive for Covid

## 2021-01-10 NOTE — H&P ADULT - PROBLEM SELECTOR PLAN 2
I ordered 1 dose of Labetalol and started Lopressor (patient aware) but may need additional medication adjustments

## 2021-01-10 NOTE — H&P ADULT - NSHPLABSRESULTS_GEN_ALL_CORE
EKG to me are NSR, NS ST-T wave changes                          12.8   3.25  )-----------( 143      ( 10 Joby 2021 04:14 )             37.6     01-10    137  |  98  |  23<H>  ----------------------------<  166<H>  3.8   |  28  |  0.7    Ca    9.8      10 Joby 2021 04:14  Mg     1.8     01-10    TPro  6.7  /  Alb  4.1  /  TBili  0.3  /  DBili  x   /  AST  43<H>  /  ALT  88<H>  /  AlkPhos  87  01-10              Lactate Trend    CARDIAC MARKERS ( 10 Joby 2021 04:14 )  x     / <0.01 ng/mL / x     / x     / x          CAPILLARY BLOOD GLUCOSE

## 2021-01-10 NOTE — H&P ADULT - NSREFPHYEXINPTDOCREFER_GEN_ALL_CORE
"Anesthesia Transfer of Care Note    Patient: Chivo Hawkins    Procedure(s) Performed: Procedure(s) (LRB):  CYSTOSCOPY, WITH BLADDER BIOPSY, WITH FULGURATION IF INDICATED (N/A)    Patient location: PACU    Anesthesia Type: general    Transport from OR: Transported from OR on 6-10 L/min O2 by face mask with adequate spontaneous ventilation    Post pain: adequate analgesia    Post assessment: no apparent anesthetic complications    Post vital signs: stable    Level of consciousness: awake    Nausea/Vomiting: no nausea/vomiting    Complications: none    Transfer of care protocol was followed      Last vitals:   Visit Vitals  BP (!) 185/77 (BP Location: Right arm, Patient Position: Lying)   Pulse 70   Temp 36.5 °C (97.7 °F) (Oral)   Resp 20   Ht 5' 5" (1.651 m)   Wt 70.3 kg (155 lb)   SpO2 96%   BMI 25.79 kg/m²     "
covid risk

## 2021-01-10 NOTE — ED ADULT NURSE NOTE - CHIEF COMPLAINT QUOTE
Pt c/o high blood pressure x3 days, dizziness, nausea and chest tightness since today. Pt verbalized that shes been under a lot of stress.

## 2021-01-10 NOTE — ED PROVIDER NOTE - ATTENDING CONTRIBUTION TO CARE
I personally evaluated the patient. I reviewed the Resident’s or Physician Assistant’s note (as assigned above), and agree with the findings and plan except as documented in my note.  Chart reviewed. H/O HTN, presents with chest tightness, headache and elevated BP tonight.  Exam shows alert patient in no distress, HEENT NCAT, lungs clear, RR S1S2, abdomen soft Nt +BS, no CCE, neuro A&OX3 no deficits.

## 2021-01-10 NOTE — ED PROVIDER NOTE - OBJECTIVE STATEMENT
66 yo female, pmh of htn and hld, presents to ed for htn and chest tightness, started tonight, mild, midsternal, described as tightness, no radiation. missed amlodipine dose tonight. denies fever, chills, sob, le swelling, nv, back pain, ha, dizziness, visual changes.

## 2021-01-10 NOTE — ED PROVIDER NOTE - CLINICAL SUMMARY MEDICAL DECISION MAKING FREE TEXT BOX
Labs including trop negative.  EKG NSR, CT depression inferolateral.  CXR negative. Given amlodipine and ASA. Will admit to low risk tele.

## 2021-01-10 NOTE — H&P ADULT - NSHPPOAPRESSUREULCER_GEN_ALL_CORE
Pt with positive pregnancy test c/o spotting since yesterday and , cramping and lower back pain x 3 days.  Pt also c/o L groin pain.
no
07-Feb-2020 22:52

## 2021-01-11 ENCOUNTER — TRANSCRIPTION ENCOUNTER (OUTPATIENT)
Age: 66
End: 2021-01-11

## 2021-01-11 VITALS
RESPIRATION RATE: 18 BRPM | SYSTOLIC BLOOD PRESSURE: 142 MMHG | HEART RATE: 66 BPM | TEMPERATURE: 98 F | OXYGEN SATURATION: 98 % | DIASTOLIC BLOOD PRESSURE: 63 MMHG

## 2021-01-11 LAB
ALBUMIN SERPL ELPH-MCNC: 4.2 G/DL — SIGNIFICANT CHANGE UP (ref 3.5–5.2)
ALP SERPL-CCNC: 78 U/L — SIGNIFICANT CHANGE UP (ref 30–115)
ALT FLD-CCNC: 80 U/L — HIGH (ref 0–41)
ANION GAP SERPL CALC-SCNC: 12 MMOL/L — SIGNIFICANT CHANGE UP (ref 7–14)
APTT BLD: 27 SEC — SIGNIFICANT CHANGE UP (ref 27–39.2)
AST SERPL-CCNC: 39 U/L — SIGNIFICANT CHANGE UP (ref 0–41)
BASOPHILS # BLD AUTO: 0.01 K/UL — SIGNIFICANT CHANGE UP (ref 0–0.2)
BASOPHILS NFR BLD AUTO: 0.3 % — SIGNIFICANT CHANGE UP (ref 0–1)
BILIRUB SERPL-MCNC: 0.9 MG/DL — SIGNIFICANT CHANGE UP (ref 0.2–1.2)
BLD GP AB SCN SERPL QL: SIGNIFICANT CHANGE UP
BUN SERPL-MCNC: 19 MG/DL — SIGNIFICANT CHANGE UP (ref 10–20)
CALCIUM SERPL-MCNC: 9 MG/DL — SIGNIFICANT CHANGE UP (ref 8.5–10.1)
CHLORIDE SERPL-SCNC: 99 MMOL/L — SIGNIFICANT CHANGE UP (ref 98–110)
CK MB CFR SERPL CALC: 1.7 NG/ML — SIGNIFICANT CHANGE UP (ref 0.6–6.3)
CK SERPL-CCNC: 50 U/L — SIGNIFICANT CHANGE UP (ref 0–225)
CO2 SERPL-SCNC: 27 MMOL/L — SIGNIFICANT CHANGE UP (ref 17–32)
CREAT SERPL-MCNC: 0.7 MG/DL — SIGNIFICANT CHANGE UP (ref 0.7–1.5)
D DIMER BLD IA.RAPID-MCNC: 130 NG/ML DDU — SIGNIFICANT CHANGE UP (ref 0–230)
EOSINOPHIL # BLD AUTO: 0.08 K/UL — SIGNIFICANT CHANGE UP (ref 0–0.7)
EOSINOPHIL NFR BLD AUTO: 2.1 % — SIGNIFICANT CHANGE UP (ref 0–8)
FIBRINOGEN PPP-MCNC: 324 MG/DL — SIGNIFICANT CHANGE UP (ref 204.4–570.6)
GLUCOSE SERPL-MCNC: 192 MG/DL — HIGH (ref 70–99)
HCT VFR BLD CALC: 39.3 % — SIGNIFICANT CHANGE UP (ref 37–47)
HGB BLD-MCNC: 13.3 G/DL — SIGNIFICANT CHANGE UP (ref 12–16)
IMM GRANULOCYTES NFR BLD AUTO: 0.3 % — SIGNIFICANT CHANGE UP (ref 0.1–0.3)
INR BLD: 0.93 RATIO — SIGNIFICANT CHANGE UP (ref 0.65–1.3)
LYMPHOCYTES # BLD AUTO: 0.97 K/UL — LOW (ref 1.2–3.4)
LYMPHOCYTES # BLD AUTO: 25.8 % — SIGNIFICANT CHANGE UP (ref 20.5–51.1)
MAGNESIUM SERPL-MCNC: 1.7 MG/DL — LOW (ref 1.8–2.4)
MCHC RBC-ENTMCNC: 29 PG — SIGNIFICANT CHANGE UP (ref 27–31)
MCHC RBC-ENTMCNC: 33.8 G/DL — SIGNIFICANT CHANGE UP (ref 32–37)
MCV RBC AUTO: 85.8 FL — SIGNIFICANT CHANGE UP (ref 81–99)
MONOCYTES # BLD AUTO: 0.31 K/UL — SIGNIFICANT CHANGE UP (ref 0.1–0.6)
MONOCYTES NFR BLD AUTO: 8.2 % — SIGNIFICANT CHANGE UP (ref 1.7–9.3)
NEUTROPHILS # BLD AUTO: 2.38 K/UL — SIGNIFICANT CHANGE UP (ref 1.4–6.5)
NEUTROPHILS NFR BLD AUTO: 63.3 % — SIGNIFICANT CHANGE UP (ref 42.2–75.2)
NRBC # BLD: 0 /100 WBCS — SIGNIFICANT CHANGE UP (ref 0–0)
PHOSPHATE SERPL-MCNC: 3 MG/DL — SIGNIFICANT CHANGE UP (ref 2.1–4.9)
PLATELET # BLD AUTO: 146 K/UL — SIGNIFICANT CHANGE UP (ref 130–400)
POTASSIUM SERPL-MCNC: 3.4 MMOL/L — LOW (ref 3.5–5)
POTASSIUM SERPL-SCNC: 3.4 MMOL/L — LOW (ref 3.5–5)
PROT SERPL-MCNC: 7.1 G/DL — SIGNIFICANT CHANGE UP (ref 6–8)
PROTHROM AB SERPL-ACNC: 10.7 SEC — SIGNIFICANT CHANGE UP (ref 9.95–12.87)
RBC # BLD: 4.58 M/UL — SIGNIFICANT CHANGE UP (ref 4.2–5.4)
RBC # FLD: 13.6 % — SIGNIFICANT CHANGE UP (ref 11.5–14.5)
SODIUM SERPL-SCNC: 138 MMOL/L — SIGNIFICANT CHANGE UP (ref 135–146)
TROPONIN T SERPL-MCNC: <0.01 NG/ML — SIGNIFICANT CHANGE UP
WBC # BLD: 3.76 K/UL — LOW (ref 4.8–10.8)
WBC # FLD AUTO: 3.76 K/UL — LOW (ref 4.8–10.8)

## 2021-01-11 PROCEDURE — 99239 HOSP IP/OBS DSCHRG MGMT >30: CPT

## 2021-01-11 RX ORDER — METOPROLOL TARTRATE 50 MG
1 TABLET ORAL
Qty: 60 | Refills: 0
Start: 2021-01-11 | End: 2021-02-09

## 2021-01-11 RX ORDER — ASPIRIN/CALCIUM CARB/MAGNESIUM 324 MG
0 TABLET ORAL
Qty: 0 | Refills: 0 | DISCHARGE

## 2021-01-11 RX ORDER — CHOLECALCIFEROL (VITAMIN D3) 125 MCG
0 CAPSULE ORAL
Qty: 0 | Refills: 0 | DISCHARGE

## 2021-01-11 RX ORDER — PREGABALIN 225 MG/1
1 CAPSULE ORAL
Qty: 0 | Refills: 0 | DISCHARGE

## 2021-01-11 RX ORDER — ASPIRIN/CALCIUM CARB/MAGNESIUM 324 MG
1 TABLET ORAL
Qty: 0 | Refills: 0 | DISCHARGE
Start: 2021-01-11

## 2021-01-11 RX ORDER — PREGABALIN 225 MG/1
1 CAPSULE ORAL
Qty: 0 | Refills: 0 | DISCHARGE
Start: 2021-01-11

## 2021-01-11 RX ORDER — LOSARTAN POTASSIUM 100 MG/1
1 TABLET, FILM COATED ORAL
Qty: 0 | Refills: 0 | DISCHARGE

## 2021-01-11 RX ORDER — LOSARTAN POTASSIUM 100 MG/1
1 TABLET, FILM COATED ORAL
Qty: 0 | Refills: 0 | DISCHARGE
Start: 2021-01-11

## 2021-01-11 RX ORDER — CHOLECALCIFEROL (VITAMIN D3) 125 MCG
2000 CAPSULE ORAL
Qty: 0 | Refills: 0 | DISCHARGE
Start: 2021-01-11

## 2021-01-11 RX ADMIN — LOSARTAN POTASSIUM 100 MILLIGRAM(S): 100 TABLET, FILM COATED ORAL at 06:27

## 2021-01-11 RX ADMIN — Medication 50 MILLIGRAM(S): at 06:27

## 2021-01-11 RX ADMIN — Medication 25 MILLIGRAM(S): at 06:27

## 2021-01-11 NOTE — DISCHARGE NOTE PROVIDER - CARE PROVIDER_API CALL
[facility]  Cardiovascular Disease  [address] [place] [address]  Phone: [phone]  Fax: [phone]  Follow Up Time: 1 week    Claudia Monreal)  Geriatric Medicine; Internal Medicine  [address] [place] [address]  Phone: [phone]  Fax: [phone]  Follow Up Time: 1 week

## 2021-01-11 NOTE — DISCHARGE NOTE PROVIDER - HOSPITAL COURSE
64yo female with known history of HTN, R CEA,  HLD presents to the [facility] ER due to a squeezing mid sternal chest discomfort. Found to COVID + and was sent to [facility]    #Chest pain likely pleuritic 2/2 COVID 19  - COVID-19 PCR positive  - EKG: NSR, non specific ST segment changes (unchanged from prior EKG)  - Tropx3 negative, fu serial EKG  - Chest X-ray: No consolidation, effusion or pneumothorax., Subacute right humeral neck fracture.  - Patient is saturating _97__ % on room air  - fu  LDH, procalcitonin, creatine kinase, troponin, CRP, ferritin, D-dimer, PTT/PT/INR,   - Repeat inflammatory markers (D-dimer, CRP, ferritin) Q48-72H if clinically worsening  - Active type and screen in case of plasma  - Incentive spirometry at bedside  - supportive measures : antipyretics, antitussives  - Isolation precautions (contact, droplet, airborne)  - admit to telemetry    #Subacute R humerus neck Fracture, old fall  - observed on CXR  - ortho consult  - PT /OT    #: Hypertension  - initially high s/p 1 dose of Labetalol and started on lopressor 50mg q12h, HCTZ 25q24h, losartan 100mg q24h  - DASH diet  - fu ECHO     #HLD:   - will give lipitor for crestor.     #Vitamin D deficiency  - c/w on supplement.     #Vitamin B12 deficiency.c/w supplement.    #DIET: DASH  #DVTppx: lovenox  #GIppx: not indicated  #Activity: Increase as tolerated (within room)  #CODE: FULL  .
right hand 3rd metacarpal bone fracture, closed, recasted.

## 2021-01-11 NOTE — DISCHARGE NOTE NURSING/CASE MANAGEMENT/SOCIAL WORK - PATIENT PORTAL LINK FT
You can access the FollowMyHealth Patient Portal offered by [facility] by registering at the following website: [email or web address]. By joining [employer]’s FollowMyHealth portal, you will also be able to view your health information using other applications (apps) compatible with our system.

## 2021-01-11 NOTE — PROGRESS NOTE ADULT - ASSESSMENT
66yo female with known history of HTN, R CEA,  HLD presents to the [facility] ER due to a squeezing mid sternal chest discomfort. Found to COVID + and was sent to [facility]    #Chest pain likely pleuritic 2/2 COVID 19  r/o cardiac cause   - COVID-19 PCR positive  - EKG: NSR, non specific ST segment changes (unchanged from prior EKG)  - Tropx2 negative, fu repeat cardiac enzymes and serial EKG  - Chest X-ray: No consolidation, effusion or pneumothorax., Subacute right humeral neck fracture.  - Patient is saturating _97__ % on room air  - fu  LDH, procalcitonin, creatine kinase, troponin, CRP, ferritin, D-dimer, PTT/PT/INR,   - Repeat inflammatory markers (D-dimer, CRP, ferritin) Q48-72H if clinically worsening  - Active type and screen in case of plasma  - Incentive spirometry at bedside  - supportive measures : antipyretics, antitussives  - Isolation precautions (contact, droplet, airborne)  - admit to telemetry    #Subacute R humerus neck Fracture  - observed on CXR  - ortho consult    #: Hypertension  - initially high s/p 1 dose of Labetalol and started on lopressor 50mg q12h, HCTZ 25q24h, losartan 100mg q24h  - DASH diet  - fu ECHO     #HLD:   - will give lipitor for crestor.     #Vitamin D deficiency  - c/w on supplement.     #Vitamin B12 deficiency.c/w supplement.    #DIET: DASH  #DVTppx: lovenox  #GIppx: not indicated  #Activity: Increase as tolerated (within room)  #CODE: FULL  #Disposition: from home         66yo female with known history of HTN, R CEA,  HLD presents to the [facility] ER due to a squeezing mid sternal chest discomfort. Found to COVID + and was sent to [facility]    #Chest pain likely pleuritic 2/2 COVID 19  r/o cardiac cause   - COVID-19 PCR positive  - EKG: NSR, non specific ST segment changes (unchanged from prior EKG)  - Tropx3 negative, fu serial EKG  - Chest X-ray: No consolidation, effusion or pneumothorax., Subacute right humeral neck fracture.  - FU ECHO  - Patient is saturating _97__ % on room air  - fu  LDH, procalcitonin, creatine kinase, troponin, CRP, ferritin, D-dimer, PTT/PT/INR,   - Repeat inflammatory markers (D-dimer, CRP, ferritin) Q48-72H if clinically worsening  - Active type and screen in case of plasma  - Incentive spirometry at bedside  - supportive measures : antipyretics, antitussives  - Isolation precautions (contact, droplet, airborne)  - admit to telemetry    #Subacute R humerus neck Fracture  - observed on CXR  - ortho consult    #: Hypertension  - initially high s/p 1 dose of Labetalol and started on lopressor 50mg q12h, HCTZ 25q24h, losartan 100mg q24h  - DASH diet  - fu ECHO     #HLD:   - will give lipitor for crestor.     #Vitamin D deficiency  - c/w on supplement.     #Vitamin B12 deficiency.c/w supplement.    #DIET: DASH  #DVTppx: lovenox  #GIppx: not indicated  #Activity: Increase as tolerated (within room)  #CODE: FULL  #Disposition: from home, anticipate dc in 24 hours         64yo female with known history of HTN, R CEA,  HLD presents to the [facility] ER due to a squeezing mid sternal chest discomfort. Found to COVID + and was sent to [facility]    #Chest pain likely pleuritic 2/2 COVID 19  - COVID-19 PCR positive  - EKG: NSR, non specific ST segment changes (unchanged from prior EKG)  - Tropx3 negative, fu serial EKG  - Chest X-ray: No consolidation, effusion or pneumothorax., Subacute right humeral neck fracture.  - Patient is saturating _97__ % on room air  - fu  LDH, procalcitonin, creatine kinase, troponin, CRP, ferritin, D-dimer, PTT/PT/INR,   - Repeat inflammatory markers (D-dimer, CRP, ferritin) Q48-72H if clinically worsening  - Active type and screen in case of plasma  - Incentive spirometry at bedside  - supportive measures : antipyretics, antitussives  - Isolation precautions (contact, droplet, airborne)  - admit to telemetry    #Subacute R humerus neck Fracture, old fall  - observed on CXR  - ortho consult  - PT /OT    #: Hypertension  - initially high s/p 1 dose of Labetalol and started on lopressor 50mg q12h, HCTZ 25q24h, losartan 100mg q24h  - DASH diet  - fu ECHO     #HLD:   - will give lipitor for crestor.     #Vitamin D deficiency  - c/w on supplement.     #Vitamin B12 deficiency.c/w supplement.    #DIET: DASH  #DVTppx: lovenox  #GIppx: not indicated  #Activity: Increase as tolerated (within room)  #CODE: FULL  #Disposition: from home, anticipate dc in 24 hours

## 2021-01-11 NOTE — OCCUPATIONAL THERAPY INITIAL EVALUATION ADULT - REHAB POTENTIAL, OT EVAL
OT evaluation completed. Pt independent in all areas of function. Pt is not a candidate for skilled OT interventions. Pt is d/c from OT.

## 2021-01-11 NOTE — OCCUPATIONAL THERAPY INITIAL EVALUATION ADULT - ADDITIONAL COMMENTS
Pt resides with spouse in private ranch house. 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 Does not drive.

## 2021-01-11 NOTE — OCCUPATIONAL THERAPY INITIAL EVALUATION ADULT - PRECAUTIONS/LIMITATIONS, REHAB EVAL
cardiac precautions/fall precautions Airborne/contact/cardiac precautions/fall precautions/isolation precautions

## 2021-01-11 NOTE — DISCHARGE NOTE PROVIDER - NSDCMRMEDTOKEN_GEN_ALL_CORE_FT
aspirin 81 mg oral tablet, chewable: 1 tab(s) orally once a day  cholecalciferol oral tablet: 2000 unit(s) orally once a day  cyanocobalamin 1000 mcg oral tablet: 1 tab(s) orally once a day  hydroCHLOROthiazide 25 mg oral tablet: 1 tab(s) orally once a day  losartan 100 mg oral tablet: 1 tab(s) orally once a day  metoprolol tartrate 50 mg oral tablet: 1 tab(s) orally 2 times a day  rosuvastatin 5 mg oral tablet: 1 tab(s) orally once a day

## 2021-01-11 NOTE — DISCHARGE NOTE PROVIDER - PROVIDER TOKENS
PROVIDER:[TOKEN:[[id number]],FOLLOWUP:[1 week]],PROVIDER:[TOKEN:[[id number]],FOLLOWUP:[1 week]]

## 2021-01-11 NOTE — PROGRESS NOTE ADULT - SUBJECTIVE AND OBJECTIVE BOX
DARRIAN STEWART 65y Female  MRN#: [id number]   CODE STATUS: FULL      SUBJECTIVE  Patient is a 65y old Female who presents with a chief complaint of chest pain    Currently admitted to medicine with the primary diagnosis of COVID 19     Today is hospital day 1d,   INTERVAL HPI/ OVERNIGHT EVENTS: none    This morning she is laying in bed comfortably .   Denies shortness of breath, abdominal pain, nausea, vomiting or changes in bowel habits.   has no complaints today.     Urinating and stooling appropriately.    Present Today:           Lozano Catheter (x)No/ ()Yes?   Indication:             Central Line (x)No/ ()Yes?   Indication:          IV Fluids (x)No/ ()Yes? Type:  Rate:  Indication:    OBJECTIVE  PAST MEDICAL & SURGICAL HISTORY  Carotid stenosis    Pneumonia    H/O osteopenia    High cholesterol    HTN (hypertension)    No significant past surgical history      ALLERGIES:  No Known Allergies    HOME MEDICATIONS:  Home Medications:  aspirin 81 mg oral tablet: orally 2 times a day (10 Joby 2021 07:00)  hydroCHLOROthiazide 25 mg oral tablet: 1 tab(s) orally once a day (10 Joby 2021 07:00)  losartan 100 mg oral tablet: 1 tab(s) orally once a day (10 Joby 2021 07:00)  rosuvastatin 5 mg oral tablet: 1 tab(s) orally once a day (10 Joby 2021 07:00)  Vitamin B12 500 mcg oral tablet: 1 tab(s) orally once a day (10 Joby 2021 07:00)  Vitamin D3 5000 intl units (125 mcg) oral tablet: orally once a day (10 Joby 2021 07:00)    MEDICATIONS:  STANDING MEDICATIONS  aspirin  chewable 81 milliGRAM(s) Oral daily  atorvastatin 20 milliGRAM(s) Oral at bedtime  cholecalciferol 2000 Unit(s) Oral daily  cyanocobalamin 1000 MICROGram(s) Oral daily  enoxaparin Injectable 40 milliGRAM(s) SubCutaneous daily  hydrochlorothiazide 25 milliGRAM(s) Oral daily  losartan 100 milliGRAM(s) Oral daily  metoprolol tartrate 50 milliGRAM(s) Oral two times a day    PRN MEDICATIONS      VITAL SIGNS: Last 24 Hours  T(C): 36.6 (11 Jan 2021 07:58), Max: 37.1 (10 Joby 2021 12:13)  T(F): 97.8 (11 Jan 2021 07:58), Max: 98.8 (10 Joby 2021 12:13)  HR: 60 (11 Jan 2021 07:58) (60 - 82)  BP: 113/69 (11 Jan 2021 07:58) (113/69 - 186/67)  RR: 18 (11 Jan 2021 07:58) (16 - 18)  SpO2: 97% (11 Jan 2021 07:58) (96% - 100%)    LABS:                        12.8   3.25  )-----------( 143      ( 10 Joby 2021 04:14 )             37.6     01-10    137  |  98  |  23<H>  ----------------------------<  166<H>  3.8   |  28  |  0.7    Ca    9.8      10 Joby 2021 04:14  Mg     1.8     01-10    TPro  6.7  /  Alb  4.1  /  TBili  0.3  /  DBili  x   /  AST  43<H>  /  ALT  88<H>  /  AlkPhos  87  01-10      Troponin T, Serum: <0.01 ng/mL (01-11-21 @ 00:16)    CARDIAC MARKERS ( 11 Jan 2021 00:16 )  x     / <0.01 ng/mL / x     / x     / x      CARDIAC MARKERS ( 10 Joby 2021 04:14 )  x     / <0.01 ng/mL / x     / x     / x          RADIOLOGY:    Xray Chest 1 View- PORTABLE-Urgent (01.10.21 @ 03:30) >  No consolidation, effusion or pneumothorax.    Subacute right humeral neck fracture.    ECHO:  pending    PHYSICAL EXAM:    GENERAL: NAD, well-developed, AAOx3  HEENT:  Atraumatic, Normocephalic. EOMI, PERRLA, conjunctiva and sclera clear, No JVD  PULMONARY: Clear to auscultation bilaterally; No wheeze  CARDIOVASCULAR: Regular rate and rhythm; No murmurs, rubs, or gallops  GASTROINTESTINAL: Soft, Nontender, Nondistended; Bowel sounds present  MUSCULOSKELETAL:  2+ Peripheral Pulses, No clubbing, cyanosis, or edema  NEUROLOGY: non-focal  SKIN: No rashes or lesions       DARRIAN STEWART 65y Female  MRN#: [id number]   CODE STATUS: FULL      SUBJECTIVE  Patient is a 65y old Female who presents with a chief complaint of chest pain    Currently admitted to medicine with the primary diagnosis of COVID 19     Today is hospital day 1d,   INTERVAL HPI/ OVERNIGHT EVENTS: none, currently denies any chest pain.     This morning she is laying in bed comfortably .   Denies shortness of breath, abdominal pain, nausea, vomiting or changes in bowel habits. She was sleeping on myn exam      Present Today:           Lozano Catheter (x)No/ ()Yes?   Indication:             Central Line (x)No/ ()Yes?   Indication:          IV Fluids (x)No/ ()Yes? Type:  Rate:  Indication:    OBJECTIVE  PAST MEDICAL & SURGICAL HISTORY  Carotid stenosis    Pneumonia    H/O osteopenia    High cholesterol    HTN (hypertension)    No significant past surgical history      ALLERGIES:  No Known Allergies    HOME MEDICATIONS:  Home Medications:  aspirin 81 mg oral tablet: orally 2 times a day (10 Joby 2021 07:00)  hydroCHLOROthiazide 25 mg oral tablet: 1 tab(s) orally once a day (10 Joby 2021 07:00)  losartan 100 mg oral tablet: 1 tab(s) orally once a day (10 Joby 2021 07:00)  rosuvastatin 5 mg oral tablet: 1 tab(s) orally once a day (10 Joby 2021 07:00)  Vitamin B12 500 mcg oral tablet: 1 tab(s) orally once a day (10 Joby 2021 07:00)  Vitamin D3 5000 intl units (125 mcg) oral tablet: orally once a day (10 Joby 2021 07:00)    MEDICATIONS:  STANDING MEDICATIONS  aspirin  chewable 81 milliGRAM(s) Oral daily  atorvastatin 20 milliGRAM(s) Oral at bedtime  cholecalciferol 2000 Unit(s) Oral daily  cyanocobalamin 1000 MICROGram(s) Oral daily  enoxaparin Injectable 40 milliGRAM(s) SubCutaneous daily  hydrochlorothiazide 25 milliGRAM(s) Oral daily  losartan 100 milliGRAM(s) Oral daily  metoprolol tartrate 50 milliGRAM(s) Oral two times a day    PRN MEDICATIONS      VITAL SIGNS: Last 24 Hours  T(C): 36.6 (11 Jan 2021 07:58), Max: 37.1 (10 Joby 2021 12:13)  T(F): 97.8 (11 Jan 2021 07:58), Max: 98.8 (10 Joby 2021 12:13)  HR: 60 (11 Jan 2021 07:58) (60 - 82)  BP: 113/69 (11 Jan 2021 07:58) (113/69 - 186/67)  RR: 18 (11 Jan 2021 07:58) (16 - 18)  SpO2: 97% (11 Jan 2021 07:58) (96% - 100%)    LABS:                        12.8   3.25  )-----------( 143      ( 10 Joby 2021 04:14 )             37.6     01-10    137  |  98  |  23<H>  ----------------------------<  166<H>  3.8   |  28  |  0.7    Ca    9.8      10 Joby 2021 04:14  Mg     1.8     01-10    TPro  6.7  /  Alb  4.1  /  TBili  0.3  /  DBili  x   /  AST  43<H>  /  ALT  88<H>  /  AlkPhos  87  01-10      Troponin T, Serum: <0.01 ng/mL (01-11-21 @ 00:16)    CARDIAC MARKERS ( 11 Jan 2021 00:16 )  x     / <0.01 ng/mL / x     / x     / x      CARDIAC MARKERS ( 10 Joby 2021 04:14 )  x     / <0.01 ng/mL / x     / x     / x          RADIOLOGY:    Xray Chest 1 View- PORTABLE-Urgent (01.10.21 @ 03:30) >  No consolidation, effusion or pneumothorax.    Subacute right humeral neck fracture.    ECHO:  pending    PHYSICAL EXAM:    GENERAL: NAD, well-developed, AAOx3  HEENT:  Atraumatic, Normocephalic. EOMI, PERRLA, conjunctiva and sclera clear, No JVD  PULMONARY: Clear to auscultation bilaterally; No wheeze  CARDIOVASCULAR: Regular rate and rhythm; No murmurs, rubs, or gallops  GASTROINTESTINAL: Soft, Nontender, Nondistended; Bowel sounds present  MUSCULOSKELETAL:  2+ Peripheral Pulses, No clubbing, cyanosis, or edema  NEUROLOGY: non-focal  SKIN: No rashes or lesions

## 2021-01-11 NOTE — PHYSICAL THERAPY INITIAL EVALUATION ADULT - PERTINENT HX OF CURRENT PROBLEM, REHAB EVAL
64yo female with known history of HTN and HLD presents to the ER due to a squeezing mid sternal chest discomfort.

## 2021-01-11 NOTE — DISCHARGE NOTE PROVIDER - CARE PROVIDERS DIRECT ADDRESSES
,DirectAddress_Unknown,[email or web address]

## 2021-01-12 ENCOUNTER — TRANSCRIPTION ENCOUNTER (OUTPATIENT)
Age: 66
End: 2021-01-12

## 2021-01-12 LAB — VIT D25+D1,25 OH+D1,25 PNL SERPL-MCNC: 46.5 PG/ML — SIGNIFICANT CHANGE UP (ref 19.9–79.3)

## 2021-01-14 DIAGNOSIS — E53.8 DEFICIENCY OF OTHER SPECIFIED B GROUP VITAMINS: ICD-10-CM

## 2021-01-14 DIAGNOSIS — E78.5 HYPERLIPIDEMIA, UNSPECIFIED: ICD-10-CM
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
## 2021-01-14 DIAGNOSIS — S42.291S OTHER DISPLACED FRACTURE OF UPPER END OF RIGHT HUMERUS, SEQUELA: ICD-10-CM

## 2021-01-14 DIAGNOSIS — Z79.82 LONG TERM (CURRENT) USE OF ASPIRIN: ICD-10-CM

## 2021-01-14 DIAGNOSIS — I10 ESSENTIAL (PRIMARY) HYPERTENSION: ICD-10-CM

## 2021-01-14 DIAGNOSIS — U07.1 COVID-19: ICD-10-CM

## 2021-01-14 DIAGNOSIS — E55.9 VITAMIN D DEFICIENCY, UNSPECIFIED: ICD-10-CM

## 2021-01-14 DIAGNOSIS — R07.9 CHEST PAIN, UNSPECIFIED: ICD-10-CM
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
## 2021-01-22 ENCOUNTER — TRANSCRIPTION ENCOUNTER (OUTPATIENT)
Age: 66
End: 2021-01-22

## 2021-02-17 NOTE — ASU PATIENT PROFILE, ADULT - SUPPORT PERSON NAME
STANDARD NOTE    Name: Joanie Teixeira  Age: 21 year old  Gender: female      Chief Complaint   Patient presents with   • Office Visit     wellness visit and est care      Here for check up.  She was Hep B non-immune 2 years ago and got 2 booster shots in 2018.  Periods are regular.  Never had a pap smear.  She is sexually active.  She does have occasional anxiety.  She used hydroxyzine in the past and that helped.  Had STI testing a few years ago.  Has not had a new sexual partner.      Current Outpatient Medications   Medication Sig Dispense Refill   • hydrOXYzine (ATARAX) 25 MG tablet Take 1 tablet by mouth daily as needed for Itching. 30 tablet 3     No current facility-administered medications for this visit.        ALLERGIES:  Patient has no known allergies.    No past medical history on file.    No past surgical history on file.     Family History   Problem Relation Age of Onset   • Cancer, Lung Paternal Grandmother    • Hypertension Mother        Social History     Socioeconomic History   • Marital status: Single     Spouse name: Not on file   • Number of children: Not on file   • Years of education: Not on file   • Highest education level: Not on file   Occupational History   • Not on file   Social Needs   • Financial resource strain: Not on file   • Food insecurity     Worry: Not on file     Inability: Not on file   • Transportation needs     Medical: Not on file     Non-medical: Not on file   Tobacco Use   • Smoking status: Former Smoker     Packs/day: 0.00   • Smokeless tobacco: Never Used   Substance and Sexual Activity   • Alcohol use: Yes     Frequency: Monthly or less   • Drug use: Never   • Sexual activity: Yes     Partners: Male   Lifestyle   • Physical activity     Days per week: 0 days     Minutes per session: Not on file   • Stress: Not on file   Relationships   • Social connections     Talks on phone: Not on file     Gets together: Not on file     Attends [employer] service: Not on file     Active  member of club or organization: Not on file     Attends meetings of clubs or organizations: Not on file     Relationship status: Not on file   • Intimate partner violence     Fear of current or ex partner: Not on file     Emotionally abused: Not on file     Physically abused: Not on file     Forced sexual activity: Not on file   Other Topics Concern   • Not on file   Social History Narrative   • Not on file         Review of Systems   Constitutional: Negative.    Genitourinary: Negative for menstrual problem.   Psychiatric/Behavioral: The patient is nervous/anxious.        Visit Vitals  /82 (BP Location: RUE - Right upper extremity)   Pulse 98   Temp 98.6 °F (37 °C)   Resp 18   Ht 5' 4\" (1.626 m)   Wt 76 kg (167 lb 8.8 oz)   LMP 02/08/2021 (Exact Date)   SpO2 99%   BMI 28.76 kg/m²       Physical Exam   Constitutional: She is oriented to person, place, and time. She appears well-developed and well-nourished. No distress.   HENT:   Right Ear: Tympanic membrane and ear canal normal.   Left Ear: Tympanic membrane and ear canal normal.   Cardiovascular: Normal rate and regular rhythm.   Pulmonary/Chest: Effort normal and breath sounds normal.   Genitourinary:    Vagina and uterus normal.   Cervix exhibits no motion tenderness, no discharge and no friability. Right adnexum displays no mass, no tenderness and no fullness. Left adnexum displays no mass, no tenderness and no fullness.   Neurological: She is alert and oriented to person, place, and time.   Psychiatric: She has a normal mood and affect.     Assessment and Plan  Need for hepatitis B vaccination  - HEP B VACC ADULT 3 DOSE, IM    Anxiety  - hydrOXYzine (ATARAX) 25 MG tablet; Take 1 tablet by mouth daily as needed for Itching.  Dispense: 30 tablet; Refill: 3    Cervical cancer screening  - PAP ORDER    Refer to orders.  Recommended that she finish the HPV and Hepatitis A vaccine series.  Follow up as directed.  She voiced understanding of and agreement with  the above plan.         SEE ABOVE

## 2021-07-02 ENCOUNTER — EMERGENCY (EMERGENCY)
Facility: HOSPITAL | Age: 66
LOS: 0 days | Discharge: HOME | End: 2021-07-02
Attending: STUDENT IN AN ORGANIZED HEALTH CARE EDUCATION/TRAINING PROGRAM | Admitting: STUDENT IN AN ORGANIZED HEALTH CARE EDUCATION/TRAINING PROGRAM
Payer: MEDICARE

## 2021-07-02 VITALS
TEMPERATURE: 98 F | HEIGHT: 62 IN | HEART RATE: 82 BPM | SYSTOLIC BLOOD PRESSURE: 232 MMHG | DIASTOLIC BLOOD PRESSURE: 103 MMHG | OXYGEN SATURATION: 100 % | RESPIRATION RATE: 20 BRPM | WEIGHT: 156.09 LBS

## 2021-07-02 VITALS — SYSTOLIC BLOOD PRESSURE: 230 MMHG | DIASTOLIC BLOOD PRESSURE: 97 MMHG

## 2021-07-02 DIAGNOSIS — I10 ESSENTIAL (PRIMARY) HYPERTENSION: ICD-10-CM

## 2021-07-02 DIAGNOSIS — Z86.79 PERSONAL HISTORY OF OTHER DISEASES OF THE CIRCULATORY SYSTEM: ICD-10-CM

## 2021-07-02 DIAGNOSIS — Z87.39 PERSONAL HISTORY OF OTHER DISEASES OF THE MUSCULOSKELETAL SYSTEM AND CONNECTIVE TISSUE: ICD-10-CM

## 2021-07-02 DIAGNOSIS — E78.5 HYPERLIPIDEMIA, UNSPECIFIED: ICD-10-CM

## 2021-07-02 DIAGNOSIS — Z79.82 LONG TERM (CURRENT) USE OF ASPIRIN: ICD-10-CM

## 2021-07-02 DIAGNOSIS — E78.00 PURE HYPERCHOLESTEROLEMIA, UNSPECIFIED: ICD-10-CM

## 2021-07-02 DIAGNOSIS — Z79.899 OTHER LONG TERM (CURRENT) DRUG THERAPY: ICD-10-CM

## 2021-07-02 DIAGNOSIS — Z87.09 PERSONAL HISTORY OF OTHER DISEASES OF THE RESPIRATORY SYSTEM: ICD-10-CM

## 2021-07-02 PROCEDURE — 99284 EMERGENCY DEPT VISIT MOD MDM: CPT

## 2021-07-02 PROCEDURE — 93010 ELECTROCARDIOGRAM REPORT: CPT

## 2021-07-02 RX ORDER — METOPROLOL TARTRATE 50 MG
50 TABLET ORAL ONCE
Refills: 0 | Status: COMPLETED | OUTPATIENT
Start: 2021-07-02 | End: 2021-07-02

## 2021-07-02 RX ADMIN — Medication 50 MILLIGRAM(S): at 05:38

## 2021-07-02 NOTE — ED PROVIDER NOTE - NSFOLLOWUPINSTRUCTIONS_ED_ALL_ED_FT
Take all medications as prescribed. Follow up with your primary care doctor in 1-2 days for blood pressure check.    Hypertension    Hypertension, commonly called high blood pressure, is when the force of blood pumping through your arteries is too strong. Hypertension forces your heart to work harder to pump blood. Your arteries may become narrow or stiff. Having untreated or uncontrolled hypertension for a long period of time can cause heart attack, stroke, kidney disease, and other problems. If started on a medication, take exactly as prescribed by your health care professional. Maintain a healthy lifestyle and follow up with your primary care physician.    SEEK IMMEDIATE MEDICAL CARE IF YOU HAVE ANY OF THE FOLLOWING SYMPTOMS: severe headache, confusion, chest pain, abdominal pain, vomiting, or shortness of breath.

## 2021-07-02 NOTE — ED PROVIDER NOTE - PHYSICAL EXAMINATION
Physical Exam    Vital Signs: I have reviewed the initial vital signs.  Constitutional: well-nourished, appears stated age, no acute distress  Eyes: Conjunctiva pink, Sclera clear, PERRLA, EOMI without pain.   Cardiovascular: S1 and S2, regular rate, regular rhythm, well-perfused extremities, radial pulses equal and 2+ b/l.   Respiratory: unlabored respiratory effort, clear to auscultation bilaterally no wheezing, rales and rhonchi. pt is speaking full sentences. no accessory muscle use.   Gastrointestinal: soft, non-tender, nondistended abdomen, no pulsatile mass, normal bowl sounds, no rebound, no guarding, no organomegaly.   Musculoskeletal: supple neck, no lower extremity edema, no calf tenderness  Integumentary: warm, dry, no rash  Neurologic: awake, alert, cranial nerves II-XII grossly intact, extremities’ motor and sensory functions grossly intact. finger to nose intact. negative pronator drift. negative romberg. steady gait.   Psychiatric: appropriate mood, appropriate affect

## 2021-07-02 NOTE — ED ADULT NURSE NOTE - CHIEF COMPLAINT QUOTE
patient states that she couldn't sleep so she took her BP at home and it has been in the 200s. denies headache/chest pain/sob. took all bp meds as prescribed

## 2021-07-02 NOTE — ED PROVIDER NOTE - ATTENDING CONTRIBUTION TO CARE
66 yo f hx htn, hld  pt states tonight, she felt a little off and could not sleep. pt took her BP and it was elevated. no other sx- cp, sob, vertigo, syncope. pt takes metoprolol at 6am    vss  gen- NAD, aaox3  card-rrr  lungs-ctab, no wheezing or rhonchi  abd-sntnd, no guarding or rebound  neuro- full str/sensation, cn ii-xii grossly intact, normal coordination     asymptomatic htn, will give metoprolol, reassess, ed obs period

## 2021-07-02 NOTE — ED PROVIDER NOTE - PATIENT PORTAL LINK FT
You can access the FollowMyHealth Patient Portal offered by [facility] by registering at the following website: [email or web address]. By joining [employer]’s FollowMyHealth portal, you will also be able to view your health information using other applications (apps) compatible with our system.

## 2021-07-02 NOTE — ED PROVIDER NOTE - CLINICAL SUMMARY MEDICAL DECISION MAKING FREE TEXT BOX
pt presents for eval of elevated bp. home med given and bp improved. pt asymptomatic. will dc and rec pcp f/u

## 2021-07-02 NOTE — ED PROVIDER NOTE - OBJECTIVE STATEMENT
66 y/o female with a PMH of HTN on metoprolol tarte 50mg 2x daily, HCTZ 25mg 1x daily, losartan 100mg 1x daily, and HLD presents to the ED for evaluation of high bp. pt reports she could not sleep all night and took her bp which was 200/111? pt reports she is due for metoprolol at 6AM. pt has an appt with her pcp at 1pm this afternoon. pt denies headache, dizziness, chest pain, sob, abdominal pain, back pain, numbness, tingling, weakness, slurred speech, visual changes, or syncope.

## 2021-07-02 NOTE — ED ADULT TRIAGE NOTE - CHIEF COMPLAINT QUOTE
patient states that she couldn't sleep so she took her BP at home and it has been in the 200s. denies headache/chest pain/sob patient states that she couldn't sleep so she took her BP at home and it has been in the 200s. denies headache/chest pain/sob. took all bp meds as prescribed

## 2021-07-02 NOTE — ED ADULT NURSE NOTE - OBJECTIVE STATEMENT
pt is a 64 yo f states tonight, she felt a little off and could not sleep. pt took her BP and it was elevated. no other sx- cp, sob, vertigo, syncope.

## 2021-12-17 NOTE — PRE-ANESTHESIA EVALUATION ADULT - BP NONINVASIVE SYSTOLIC (MM HG)
157 Seen and examined, states while driving had onset of tingling and numbness to L face/arm/leg. Pt. was making a turn at onset, initially pulled over, but was able to cont. using arm normally and cont. to drive. Face and leg c/o resolved but cont. to have LUE tingling, full ROM, NT ext., no swelling, no erythema, strength 5/5. Sx resolving in ED.

## 2022-10-29 ENCOUNTER — RESULT REVIEW (OUTPATIENT)
Age: 67
End: 2022-10-29

## 2022-10-29 ENCOUNTER — OUTPATIENT (OUTPATIENT)
Dept: OUTPATIENT SERVICES | Facility: HOSPITAL | Age: 67
LOS: 1 days | Discharge: HOME | End: 2022-10-29

## 2022-10-29 DIAGNOSIS — Z12.31 ENCOUNTER FOR SCREENING MAMMOGRAM FOR MALIGNANT NEOPLASM OF BREAST: ICD-10-CM

## 2022-10-29 PROCEDURE — 77063 BREAST TOMOSYNTHESIS BI: CPT | Mod: 26

## 2022-10-29 PROCEDURE — 77067 SCR MAMMO BI INCL CAD: CPT | Mod: 26

## 2022-10-31 DIAGNOSIS — Z87.310 PERSONAL HISTORY OF (HEALED) OSTEOPOROSIS FRACTURE: ICD-10-CM

## 2022-10-31 DIAGNOSIS — Z78.0 ASYMPTOMATIC MENOPAUSAL STATE: ICD-10-CM

## 2022-10-31 DIAGNOSIS — Z13.820 ENCOUNTER FOR SCREENING FOR OSTEOPOROSIS: ICD-10-CM

## 2022-10-31 DIAGNOSIS — M89.9 DISORDER OF BONE, UNSPECIFIED: ICD-10-CM

## 2023-09-06 NOTE — ED ADULT TRIAGE NOTE - PRO INTERPRETER NEED 2

## 2023-11-15 ENCOUNTER — OUTPATIENT (OUTPATIENT)
Dept: OUTPATIENT SERVICES | Facility: HOSPITAL | Age: 68
LOS: 1 days | End: 2023-11-15
Payer: MEDICARE

## 2023-11-15 DIAGNOSIS — R92.2 INCONCLUSIVE MAMMOGRAM: ICD-10-CM

## 2023-11-15 DIAGNOSIS — Z12.31 ENCOUNTER FOR SCREENING MAMMOGRAM FOR MALIGNANT NEOPLASM OF BREAST: ICD-10-CM

## 2023-11-15 DIAGNOSIS — R92.8 OTHER ABNORMAL AND INCONCLUSIVE FINDINGS ON DIAGNOSTIC IMAGING OF BREAST: ICD-10-CM

## 2023-11-15 PROCEDURE — 77066 DX MAMMO INCL CAD BI: CPT | Mod: 26

## 2023-11-15 PROCEDURE — 77066 DX MAMMO INCL CAD BI: CPT

## 2023-11-15 PROCEDURE — G0279: CPT | Mod: 26

## 2023-11-15 PROCEDURE — G0279: CPT

## 2023-11-15 PROCEDURE — 76642 ULTRASOUND BREAST LIMITED: CPT | Mod: 50

## 2023-11-15 PROCEDURE — 76642 ULTRASOUND BREAST LIMITED: CPT | Mod: 26,50

## 2023-11-16 DIAGNOSIS — R92.8 OTHER ABNORMAL AND INCONCLUSIVE FINDINGS ON DIAGNOSTIC IMAGING OF BREAST: ICD-10-CM

## 2023-11-24 ENCOUNTER — RESULT REVIEW (OUTPATIENT)
Age: 68
End: 2023-11-24

## 2023-11-24 ENCOUNTER — APPOINTMENT (OUTPATIENT)
Age: 68
End: 2023-11-24
Payer: MEDICARE

## 2023-11-24 ENCOUNTER — OUTPATIENT (OUTPATIENT)
Dept: OUTPATIENT SERVICES | Facility: HOSPITAL | Age: 68
LOS: 1 days | End: 2023-11-24
Payer: MEDICARE

## 2023-11-24 DIAGNOSIS — R92.8 OTHER ABNORMAL AND INCONCLUSIVE FINDINGS ON DIAGNOSTIC IMAGING OF BREAST: ICD-10-CM

## 2023-11-24 PROCEDURE — 77065 DX MAMMO INCL CAD UNI: CPT | Mod: 26,RT

## 2023-11-24 PROCEDURE — 77065 DX MAMMO INCL CAD UNI: CPT | Mod: RT

## 2023-11-24 PROCEDURE — 19083 BX BREAST 1ST LESION US IMAG: CPT | Mod: RT

## 2023-11-24 PROCEDURE — 88305 TISSUE EXAM BY PATHOLOGIST: CPT | Mod: 26

## 2023-11-24 PROCEDURE — 88305 TISSUE EXAM BY PATHOLOGIST: CPT

## 2023-11-24 PROCEDURE — A4648: CPT

## 2023-11-27 LAB
SURGICAL PATHOLOGY STUDY: SIGNIFICANT CHANGE UP
SURGICAL PATHOLOGY STUDY: SIGNIFICANT CHANGE UP

## 2023-11-29 DIAGNOSIS — D24.1 BENIGN NEOPLASM OF RIGHT BREAST: ICD-10-CM

## 2023-11-29 DIAGNOSIS — N60.91 UNSPECIFIED BENIGN MAMMARY DYSPLASIA OF RIGHT BREAST: ICD-10-CM

## 2023-11-29 DIAGNOSIS — N63.10 UNSPECIFIED LUMP IN THE RIGHT BREAST, UNSPECIFIED QUADRANT: ICD-10-CM

## 2023-11-29 DIAGNOSIS — N64.89 OTHER SPECIFIED DISORDERS OF BREAST: ICD-10-CM

## 2023-12-04 ENCOUNTER — APPOINTMENT (OUTPATIENT)
Dept: BREAST CENTER | Facility: CLINIC | Age: 68
End: 2023-12-04
Payer: MEDICARE

## 2023-12-04 VITALS
BODY MASS INDEX: 27.97 KG/M2 | WEIGHT: 152 LBS | SYSTOLIC BLOOD PRESSURE: 150 MMHG | HEART RATE: 75 BPM | HEIGHT: 62 IN | DIASTOLIC BLOOD PRESSURE: 80 MMHG

## 2023-12-04 PROCEDURE — 99204 OFFICE O/P NEW MOD 45 MIN: CPT

## 2023-12-15 NOTE — DISCHARGE NOTE PROVIDER - NSDCQMAMI_CARD_ALL_CORE
Patient Contact     Attempt # 3     Was call answered?  No.  Left message on voicemail with information to call nurse back at [phone].      Mariela GOOD RN  [facility]  [phone]   No

## 2023-12-20 NOTE — ED ADULT NURSE NOTE - NS ED NURSE RECORD ANOTHER HT AND WT
Patient seen and examined at bedside.    --Anticoagulation--  enoxaparin Injectable 40 milliGRAM(s) SubCutaneous every 24 hours    T(C): 36.7 (12-20-23 @ 03:11), Max: 38.5 (12-19-23 @ 17:00)  HR: 104 (12-20-23 @ 01:02) (96 - 125)  BP: 115/82 (12-20-23 @ 03:11) (97/82 - 115/82)  RR: 16 (12-20-23 @ 01:02) (16 - 20)  SpO2: 97% (12-20-23 @ 00:24) (96% - 99%)  Wt(kg): --    Exam: EOV, regards, nonverbal, no FC, BUE spont AG, BLE spont   Yes

## 2024-04-24 ENCOUNTER — NON-APPOINTMENT (OUTPATIENT)
Age: 69
End: 2024-04-24

## 2024-05-29 ENCOUNTER — EMERGENCY (EMERGENCY)
Facility: HOSPITAL | Age: 69
LOS: 0 days | Discharge: HOME | End: 2024-05-29
Attending: EMERGENCY MEDICINE
Payer: MEDICARE

## 2024-05-29 VITALS
SYSTOLIC BLOOD PRESSURE: 218 MMHG | HEART RATE: 88 BPM | DIASTOLIC BLOOD PRESSURE: 84 MMHG | OXYGEN SATURATION: 100 % | HEIGHT: 63 IN | WEIGHT: 149.91 LBS | RESPIRATION RATE: 17 BRPM | TEMPERATURE: 98 F

## 2024-05-29 VITALS
TEMPERATURE: 98 F | DIASTOLIC BLOOD PRESSURE: 82 MMHG | RESPIRATION RATE: 18 BRPM | HEART RATE: 63 BPM | SYSTOLIC BLOOD PRESSURE: 214 MMHG | OXYGEN SATURATION: 98 %

## 2024-05-29 DIAGNOSIS — R51.9 HEADACHE, UNSPECIFIED: ICD-10-CM

## 2024-05-29 DIAGNOSIS — I10 ESSENTIAL (PRIMARY) HYPERTENSION: ICD-10-CM

## 2024-05-29 PROCEDURE — 99284 EMERGENCY DEPT VISIT MOD MDM: CPT

## 2024-05-29 PROCEDURE — 93010 ELECTROCARDIOGRAM REPORT: CPT

## 2024-05-29 PROCEDURE — 99283 EMERGENCY DEPT VISIT LOW MDM: CPT | Mod: 25

## 2024-05-29 PROCEDURE — 93005 ELECTROCARDIOGRAM TRACING: CPT

## 2024-05-29 NOTE — ED PROVIDER NOTE - OBJECTIVE STATEMENT
patient c/o HTN, felt HA today took BP ,   Currently keeps daily log of BP for PMD,  /80 range until today, No HA at this time, no chest , no SOB

## 2024-05-29 NOTE — ED ADULT NURSE NOTE - NS ED NOTE ABUSE SUSPICION NEGLECT YN
[FreeTextEntry8] : Pt complains of a rash with multiple painful pustular lesions in the right axilla that was present for the past 2 weeks. She applied steroid cream and desitin to the area and says this has caused the pus to drain and some of the lesions to "dry up". The pain started getting better in the underarm but now she reports having some pain in her right breast. It was bad enough that she had to use an ice pack. Also has one lesion in her left axilla. Pt denies using a razor in that area. Uses a body sponge to shower.   No

## 2024-05-29 NOTE — ED PROVIDER NOTE - PATIENT PORTAL LINK FT
You can access the FollowMyHealth Patient Portal offered by [facility] by registering at the following website: [email or web address]. By joining [employer]’s FollowMyHealth portal, you will also be able to view your health information using other applications (apps) compatible with our system. Yes

## 2024-05-29 NOTE — ED PROVIDER NOTE - ATTENDING APP SHARED VISIT CONTRIBUTION OF CARE
68-year-old female history of hypertension, presents for evaluation of elevated blood pressure today.  Patient states she takes a daily log and usually it is in good range however today she felt slight headaches and took her blood pressure found to be elevated.  Patient states headache is gone.  No chest pain or shortness of breath.  Patient compliant with medications.  On exam patient in NAD, AAOx3, good tone equal strength, seen and placed steady gait

## 2024-05-29 NOTE — ED PROVIDER NOTE - CLINICAL SUMMARY MEDICAL DECISION MAKING FREE TEXT BOX
Patient currently asymptomatic, will continue to monitor blood pressure at home.  Patient will call PMD and arrange for follow-up.  For repeat pressure. Sotyktu Counseling:  I discussed the most common side effects of Sotyktu including: common cold, sore throat, sinus infections, cold sores, canker sores, folliculitis, and acne.  I also discussed more serious side effects of Sotyktu including but not limited to: serious allergic reactions; increased risk for infections such as TB; cancers such as lymphomas; rhabdomyolysis and elevated CPK; and elevated triglycerides and liver enzymes.

## 2024-06-17 ENCOUNTER — RESULT REVIEW (OUTPATIENT)
Age: 69
End: 2024-06-17

## 2024-06-17 ENCOUNTER — OUTPATIENT (OUTPATIENT)
Dept: OUTPATIENT SERVICES | Facility: HOSPITAL | Age: 69
LOS: 1 days | End: 2024-06-17
Payer: MEDICARE

## 2024-06-17 DIAGNOSIS — R92.8 OTHER ABNORMAL AND INCONCLUSIVE FINDINGS ON DIAGNOSTIC IMAGING OF BREAST: ICD-10-CM

## 2024-06-17 PROCEDURE — 76642 ULTRASOUND BREAST LIMITED: CPT | Mod: RT

## 2024-06-17 PROCEDURE — 76642 ULTRASOUND BREAST LIMITED: CPT | Mod: 26,RT

## 2024-06-18 DIAGNOSIS — R92.8 OTHER ABNORMAL AND INCONCLUSIVE FINDINGS ON DIAGNOSTIC IMAGING OF BREAST: ICD-10-CM

## 2024-06-24 ENCOUNTER — APPOINTMENT (OUTPATIENT)
Dept: BREAST CENTER | Facility: CLINIC | Age: 69
End: 2024-06-24
Payer: MEDICARE

## 2024-06-24 VITALS
HEART RATE: 77 BPM | BODY MASS INDEX: 27.05 KG/M2 | SYSTOLIC BLOOD PRESSURE: 130 MMHG | WEIGHT: 147 LBS | DIASTOLIC BLOOD PRESSURE: 74 MMHG | HEIGHT: 62 IN

## 2024-06-24 DIAGNOSIS — R92.30 DENSE BREASTS, UNSPECIFIED: ICD-10-CM

## 2024-06-24 DIAGNOSIS — D24.1 BENIGN NEOPLASM OF RIGHT BREAST: ICD-10-CM

## 2024-06-24 DIAGNOSIS — N64.89 OTHER SPECIFIED DISORDERS OF BREAST: ICD-10-CM

## 2024-06-24 PROCEDURE — 99214 OFFICE O/P EST MOD 30 MIN: CPT

## 2024-06-24 NOTE — HISTORY OF PRESENT ILLNESS
[FreeTextEntry1] : Sonya is 68 year old female here with new dx of right breast intraductal papilloma. She has experienced the right nipple retraction over the past month. She denies any palpable masses, skin changes or discharge. her follow up imaging was stable Her imaging is as follows: 10/29/2022 b/l mammo-->birads1 -breasts are heterogeneously dense -No suspicious mass, grouping of calcifications, or other abnormality is identified 11/15/2023 b/l mammo and US-->BIRADS4 Circumscribed mass in the left central/inferior breast corresponds to a benign cyst seen on concurrent ultrasound.  Other smaller fluctuating circumscribed masses in the left medial breast are consistent with benign cysts.  RIGHT US: -Oval hypoechoic mass in the right breast, 9:00 axis, retroareolar region measures 0.7 x 0.6 x 0.2 cm-->Ultrasound-guided biopsy -Benign duct ectasia is seen in the right retroareolar breast. LEFT US: Benign cyst is seen in the left breast, 6:00 axis, 2 cm the nipple measuring 0.5 x 0.7 x 0.3 cm. This corresponds to the mass seen on mammogram.   11/24/2023 BREAST, RIGHT RETROAREOLAR 7 MM MASS 9:00 N0, ULTRASOUND-GUIDED NEEDLE CORE BIOPSIES:(stop-light) - INTRADUCTAL PAPILLOMA CONTAINING FLORID DUCT HYPERPLASIA. - PSEUDOANGIOMATOUS STROMAL HYPERPLASIA (PASH).. -High-risk concordant histology. Negative family history for breast and ovarian cancer   06/17/2024 right US -->birads2 Again identified is  biopsied papilloma at the 9:00 location periareolar region without significant change measuring 0.5 cm x 0.4 cm x 2 cm Recommendation: Continued surgical management.

## 2024-06-24 NOTE — ASSESSMENT
[FreeTextEntry1] :   On physical exam, there are no discrete masses in either breast or axilla.   There are no skin changes bilaterally.  Her pathology results were reviewed.  Intraductal papillomas without atypia are considered fibroproliferative lesions without atypia. there is no discrete mass or associated bloody discharge. her 6 m US was stable for 5 mm papilloma. She is due for he do mammo in Oct. she canfollow up with PA after that I answered all her questions. She was in agreement with the plan. total time on encounter: 40mins

## 2024-06-24 NOTE — DATA REVIEWED
[FreeTextEntry1] : 8021     EXAM:  MG US BREAST LIMITED RT   ORDERED BY: MENDEZ GOODEN  PROCEDURE DATE:  06/17/2024    INTERPRETATION:  Clinical History / Reason for exam: Patient presents for follow-up as she is status post a benign ultrasound guided core biopsy of the right breast dated November 24, 2023 representing a papilloma.  Targeted Right Breast Sonogram:  Again identified is a piercing biopsied papilloma at the 9:00 location periareolar region without significant change measuring 0.5 cm x 0.4 cm x 2 cm. No other lesions are seen.  Impression:  Status post a benign ultrasound-guided core biopsy of the right breast dated November 24, 2023 for a papilloma.  No evidence of malignancy.  Recommendation: Continued surgical management.  BI-RADS Category 2: Benign

## 2024-09-26 NOTE — DISCHARGE NOTE PROVIDER - NSDCCPCAREPLAN_GEN_ALL_CORE_FT
POCT wet mount done, which was negative  
PRINCIPAL DISCHARGE DIAGNOSIS  Diagnosis: Chest pain  Assessment and Plan of Treatment: You came in with chest pain , cardiac cause has been ruled out. Your chest pain is pleuritic, secondary to COVID 19 . You did not require any oxygen during your hospital stay.You have tested POSITIVE for the novel coronavirus (COVID-19). Upon discharge, you must self-quarantine for 14 days, or until the Department of Health contacts you. Please wear a face mask if you are around other individuals. Try to avoid contact with house members, family, and friends for the duration of this quarantine. Please follow up with your primary care physician within 2-3 weeks of your discharge from [facility]. Please take all medications as prescribed. If you experience any worsening or recurrence of your symptoms, particularly worsening or high fever, shortness of breathe, extreme fatigue, or bloody cough please call [id number] immediately or report to the nearest Emergency Department. If you have any questions or concerns, please do not hesitate to call the hospital at [phone]      SECONDARY DISCHARGE DIAGNOSES  Diagnosis: Hypertension, poor control  Assessment and Plan of Treatment: We have added lopressor 50mg BID . Please continue your home meds as well. Please follow up with your PMD and  in 1 week.

## 2024-11-04 NOTE — ED PROVIDER NOTE - MDM ORDERS SUBMITTED SELECTION
trunk ROM limited due to spinal precautions and LSO donned./bilateral upper extremity ROM was WFL (within functional limits)/bilateral lower extremity ROM was WFL (within functional limits)
EKG/Medications

## 2025-01-21 ENCOUNTER — OUTPATIENT (OUTPATIENT)
Dept: OUTPATIENT SERVICES | Facility: HOSPITAL | Age: 70
LOS: 1 days | End: 2025-01-21
Payer: MEDICARE

## 2025-01-21 ENCOUNTER — RESULT REVIEW (OUTPATIENT)
Age: 70
End: 2025-01-21

## 2025-01-21 DIAGNOSIS — Z13.820 ENCOUNTER FOR SCREENING FOR OSTEOPOROSIS: ICD-10-CM

## 2025-01-21 DIAGNOSIS — Z00.8 ENCOUNTER FOR OTHER GENERAL EXAMINATION: ICD-10-CM

## 2025-01-21 PROCEDURE — 77080 DXA BONE DENSITY AXIAL: CPT | Mod: 26

## 2025-01-21 PROCEDURE — 77080 DXA BONE DENSITY AXIAL: CPT

## 2025-01-22 DIAGNOSIS — Z13.820 ENCOUNTER FOR SCREENING FOR OSTEOPOROSIS: ICD-10-CM

## 2025-09-20 ENCOUNTER — EMERGENCY (EMERGENCY)
Facility: HOSPITAL | Age: 70
LOS: 0 days | Discharge: HOME | End: 2025-09-20
Attending: EMERGENCY MEDICINE
Payer: MEDICARE

## 2025-09-20 VITALS
HEART RATE: 89 BPM | DIASTOLIC BLOOD PRESSURE: 74 MMHG | SYSTOLIC BLOOD PRESSURE: 191 MMHG | RESPIRATION RATE: 17 BRPM | WEIGHT: 151.9 LBS | TEMPERATURE: 98 F | OXYGEN SATURATION: 98 %

## 2025-09-20 VITALS
DIASTOLIC BLOOD PRESSURE: 84 MMHG | HEART RATE: 79 BPM | OXYGEN SATURATION: 100 % | SYSTOLIC BLOOD PRESSURE: 183 MMHG | RESPIRATION RATE: 17 BRPM | TEMPERATURE: 99 F

## 2025-09-20 DIAGNOSIS — U07.1 COVID-19: ICD-10-CM

## 2025-09-20 DIAGNOSIS — Z87.891 PERSONAL HISTORY OF NICOTINE DEPENDENCE: ICD-10-CM

## 2025-09-20 DIAGNOSIS — R06.02 SHORTNESS OF BREATH: ICD-10-CM

## 2025-09-20 DIAGNOSIS — R07.89 OTHER CHEST PAIN: ICD-10-CM

## 2025-09-20 DIAGNOSIS — R05.1 ACUTE COUGH: ICD-10-CM

## 2025-09-20 LAB
ALBUMIN SERPL ELPH-MCNC: 4.8 G/DL — SIGNIFICANT CHANGE UP (ref 3.5–5.2)
ALP SERPL-CCNC: 72 U/L — SIGNIFICANT CHANGE UP (ref 30–115)
ALT FLD-CCNC: 34 U/L — SIGNIFICANT CHANGE UP (ref 0–41)
ANION GAP SERPL CALC-SCNC: 12 MMOL/L — SIGNIFICANT CHANGE UP (ref 7–14)
AST SERPL-CCNC: 24 U/L — SIGNIFICANT CHANGE UP (ref 0–41)
BASOPHILS # BLD AUTO: 0.02 K/UL — SIGNIFICANT CHANGE UP (ref 0–0.2)
BASOPHILS NFR BLD AUTO: 0.2 % — SIGNIFICANT CHANGE UP (ref 0–1)
BILIRUB SERPL-MCNC: 0.4 MG/DL — SIGNIFICANT CHANGE UP (ref 0.2–1.2)
BUN SERPL-MCNC: 13 MG/DL — SIGNIFICANT CHANGE UP (ref 10–20)
CALCIUM SERPL-MCNC: 9.2 MG/DL — SIGNIFICANT CHANGE UP (ref 8.4–10.5)
CHLORIDE SERPL-SCNC: 94 MMOL/L — LOW (ref 98–110)
CO2 SERPL-SCNC: 25 MMOL/L — SIGNIFICANT CHANGE UP (ref 17–32)
CREAT SERPL-MCNC: 0.7 MG/DL — SIGNIFICANT CHANGE UP (ref 0.7–1.5)
D DIMER BLD IA.RAPID-MCNC: <150 NG/ML DDU — SIGNIFICANT CHANGE UP
EGFR: 92 ML/MIN/1.73M2 — SIGNIFICANT CHANGE UP
EGFR: 92 ML/MIN/1.73M2 — SIGNIFICANT CHANGE UP
EOSINOPHIL # BLD AUTO: 0.08 K/UL — SIGNIFICANT CHANGE UP (ref 0–0.7)
EOSINOPHIL NFR BLD AUTO: 1 % — SIGNIFICANT CHANGE UP (ref 0–8)
GLUCOSE SERPL-MCNC: 121 MG/DL — HIGH (ref 70–99)
HCT VFR BLD CALC: 37.6 % — SIGNIFICANT CHANGE UP (ref 37–47)
HGB BLD-MCNC: 13.2 G/DL — SIGNIFICANT CHANGE UP (ref 12–16)
IMM GRANULOCYTES NFR BLD AUTO: 0.5 % — HIGH (ref 0.1–0.3)
LYMPHOCYTES # BLD AUTO: 0.91 K/UL — LOW (ref 1.2–3.4)
LYMPHOCYTES # BLD AUTO: 11.1 % — LOW (ref 20.5–51.1)
MCHC RBC-ENTMCNC: 29.7 PG — SIGNIFICANT CHANGE UP (ref 27–31)
MCHC RBC-ENTMCNC: 35.1 G/DL — SIGNIFICANT CHANGE UP (ref 32–37)
MCV RBC AUTO: 84.5 FL — SIGNIFICANT CHANGE UP (ref 81–99)
MONOCYTES # BLD AUTO: 0.59 K/UL — SIGNIFICANT CHANGE UP (ref 0.1–0.6)
MONOCYTES NFR BLD AUTO: 7.2 % — SIGNIFICANT CHANGE UP (ref 1.7–9.3)
NEUTROPHILS # BLD AUTO: 6.55 K/UL — HIGH (ref 1.4–6.5)
NEUTROPHILS NFR BLD AUTO: 80 % — HIGH (ref 42.2–75.2)
NRBC BLD AUTO-RTO: 0 /100 WBCS — SIGNIFICANT CHANGE UP (ref 0–0)
PLATELET # BLD AUTO: 145 K/UL — SIGNIFICANT CHANGE UP (ref 130–400)
PMV BLD: 10.9 FL — HIGH (ref 7.4–10.4)
POTASSIUM SERPL-MCNC: 3.7 MMOL/L — SIGNIFICANT CHANGE UP (ref 3.5–5)
POTASSIUM SERPL-SCNC: 3.7 MMOL/L — SIGNIFICANT CHANGE UP (ref 3.5–5)
PROT SERPL-MCNC: 6.9 G/DL — SIGNIFICANT CHANGE UP (ref 6–8)
RBC # BLD: 4.45 M/UL — SIGNIFICANT CHANGE UP (ref 4.2–5.4)
RBC # FLD: 13.1 % — SIGNIFICANT CHANGE UP (ref 11.5–14.5)
SODIUM SERPL-SCNC: 131 MMOL/L — LOW (ref 135–146)
TROPONIN T, HIGH SENSITIVITY RESULT: 8 NG/L — SIGNIFICANT CHANGE UP
TROPONIN T, HIGH SENSITIVITY RESULT: 8 NG/L — SIGNIFICANT CHANGE UP
WBC # BLD: 8.19 K/UL — SIGNIFICANT CHANGE UP (ref 4.8–10.8)
WBC # FLD AUTO: 8.19 K/UL — SIGNIFICANT CHANGE UP (ref 4.8–10.8)

## 2025-09-20 PROCEDURE — 93010 ELECTROCARDIOGRAM REPORT: CPT

## 2025-09-20 PROCEDURE — 99285 EMERGENCY DEPT VISIT HI MDM: CPT | Mod: 25

## 2025-09-20 PROCEDURE — 93005 ELECTROCARDIOGRAM TRACING: CPT

## 2025-09-20 PROCEDURE — 36415 COLL VENOUS BLD VENIPUNCTURE: CPT

## 2025-09-20 PROCEDURE — 71045 X-RAY EXAM CHEST 1 VIEW: CPT | Mod: 26

## 2025-09-20 PROCEDURE — 85025 COMPLETE CBC W/AUTO DIFF WBC: CPT

## 2025-09-20 PROCEDURE — 80053 COMPREHEN METABOLIC PANEL: CPT

## 2025-09-20 PROCEDURE — 84484 ASSAY OF TROPONIN QUANT: CPT

## 2025-09-20 PROCEDURE — 85379 FIBRIN DEGRADATION QUANT: CPT

## 2025-09-20 PROCEDURE — 71045 X-RAY EXAM CHEST 1 VIEW: CPT
